# Patient Record
Sex: FEMALE | Race: WHITE | NOT HISPANIC OR LATINO
[De-identification: names, ages, dates, MRNs, and addresses within clinical notes are randomized per-mention and may not be internally consistent; named-entity substitution may affect disease eponyms.]

---

## 2017-06-13 ENCOUNTER — FORM ENCOUNTER (OUTPATIENT)
Age: 77
End: 2017-06-13

## 2017-06-14 ENCOUNTER — APPOINTMENT (OUTPATIENT)
Dept: RADIOLOGY | Facility: HOSPITAL | Age: 77
End: 2017-06-14

## 2017-06-14 ENCOUNTER — OUTPATIENT (OUTPATIENT)
Dept: OUTPATIENT SERVICES | Facility: HOSPITAL | Age: 77
LOS: 1 days | End: 2017-06-14
Payer: MEDICARE

## 2017-06-14 ENCOUNTER — APPOINTMENT (OUTPATIENT)
Dept: MRI IMAGING | Facility: HOSPITAL | Age: 77
End: 2017-06-14

## 2017-06-14 DIAGNOSIS — M43.16 SPONDYLOLISTHESIS, LUMBAR REGION: ICD-10-CM

## 2017-06-14 DIAGNOSIS — M81.0 AGE-RELATED OSTEOPOROSIS WITHOUT CURRENT PATHOLOGICAL FRACTURE: ICD-10-CM

## 2017-06-14 DIAGNOSIS — R07.81 PLEURODYNIA: ICD-10-CM

## 2017-06-14 DIAGNOSIS — M47.816 SPONDYLOSIS WITHOUT MYELOPATHY OR RADICULOPATHY, LUMBAR REGION: ICD-10-CM

## 2017-06-14 PROCEDURE — 71101 X-RAY EXAM UNILAT RIBS/CHEST: CPT

## 2017-06-14 PROCEDURE — 72148 MRI LUMBAR SPINE W/O DYE: CPT

## 2017-06-14 PROCEDURE — 77080 DXA BONE DENSITY AXIAL: CPT

## 2017-06-14 PROCEDURE — 72100 X-RAY EXAM L-S SPINE 2/3 VWS: CPT

## 2017-06-14 PROCEDURE — 72070 X-RAY EXAM THORAC SPINE 2VWS: CPT

## 2017-11-13 ENCOUNTER — MESSAGE (OUTPATIENT)
Age: 77
End: 2017-11-13

## 2018-03-19 ENCOUNTER — OUTPATIENT (OUTPATIENT)
Dept: OUTPATIENT SERVICES | Facility: HOSPITAL | Age: 78
LOS: 1 days | End: 2018-03-19
Payer: MEDICARE

## 2018-03-19 ENCOUNTER — APPOINTMENT (OUTPATIENT)
Dept: MAMMOGRAPHY | Facility: HOSPITAL | Age: 78
End: 2018-03-19

## 2018-03-19 DIAGNOSIS — Z12.31 ENCOUNTER FOR SCREENING MAMMOGRAM FOR MALIGNANT NEOPLASM OF BREAST: ICD-10-CM

## 2018-03-19 PROCEDURE — 77067 SCR MAMMO BI INCL CAD: CPT

## 2018-03-19 PROCEDURE — 77063 BREAST TOMOSYNTHESIS BI: CPT | Mod: 26

## 2018-03-19 PROCEDURE — 77063 BREAST TOMOSYNTHESIS BI: CPT

## 2018-03-19 PROCEDURE — 77067 SCR MAMMO BI INCL CAD: CPT | Mod: 26

## 2018-08-01 ENCOUNTER — OUTPATIENT (OUTPATIENT)
Dept: OUTPATIENT SERVICES | Facility: HOSPITAL | Age: 78
LOS: 1 days | End: 2018-08-01
Payer: MEDICARE

## 2018-08-01 ENCOUNTER — APPOINTMENT (OUTPATIENT)
Dept: ULTRASOUND IMAGING | Facility: HOSPITAL | Age: 78
End: 2018-08-01
Payer: MEDICARE

## 2018-08-01 DIAGNOSIS — Z00.8 ENCOUNTER FOR OTHER GENERAL EXAMINATION: ICD-10-CM

## 2018-08-01 PROCEDURE — 76770 US EXAM ABDO BACK WALL COMP: CPT

## 2018-08-01 PROCEDURE — 76770 US EXAM ABDO BACK WALL COMP: CPT | Mod: 26

## 2018-11-12 ENCOUNTER — APPOINTMENT (OUTPATIENT)
Dept: ULTRASOUND IMAGING | Facility: HOSPITAL | Age: 78
End: 2018-11-12

## 2018-11-26 ENCOUNTER — APPOINTMENT (OUTPATIENT)
Dept: ULTRASOUND IMAGING | Facility: HOSPITAL | Age: 78
End: 2018-11-26
Payer: MEDICARE

## 2018-11-26 ENCOUNTER — OUTPATIENT (OUTPATIENT)
Dept: OUTPATIENT SERVICES | Facility: HOSPITAL | Age: 78
LOS: 1 days | End: 2018-11-26
Payer: MEDICARE

## 2018-11-26 DIAGNOSIS — Z00.8 ENCOUNTER FOR OTHER GENERAL EXAMINATION: ICD-10-CM

## 2018-11-26 PROCEDURE — 76856 US EXAM PELVIC COMPLETE: CPT

## 2018-11-26 PROCEDURE — 76856 US EXAM PELVIC COMPLETE: CPT | Mod: 26

## 2019-01-04 ENCOUNTER — APPOINTMENT (OUTPATIENT)
Dept: CARDIOLOGY | Facility: CLINIC | Age: 79
End: 2019-01-04

## 2019-02-19 ENCOUNTER — APPOINTMENT (OUTPATIENT)
Dept: OBGYN | Facility: CLINIC | Age: 79
End: 2019-02-19
Payer: MEDICARE

## 2019-02-19 PROCEDURE — 99203 OFFICE O/P NEW LOW 30 MIN: CPT

## 2019-03-05 ENCOUNTER — APPOINTMENT (OUTPATIENT)
Dept: FAMILY MEDICINE | Facility: CLINIC | Age: 79
End: 2019-03-05
Payer: MEDICARE

## 2019-03-05 VITALS
HEIGHT: 61 IN | OXYGEN SATURATION: 98 % | SYSTOLIC BLOOD PRESSURE: 116 MMHG | HEART RATE: 84 BPM | DIASTOLIC BLOOD PRESSURE: 60 MMHG | BODY MASS INDEX: 22.84 KG/M2 | TEMPERATURE: 97 F | WEIGHT: 121 LBS | RESPIRATION RATE: 15 BRPM

## 2019-03-05 DIAGNOSIS — Z80.3 FAMILY HISTORY OF MALIGNANT NEOPLASM OF BREAST: ICD-10-CM

## 2019-03-05 DIAGNOSIS — Z81.8 FAMILY HISTORY OF OTHER MENTAL AND BEHAVIORAL DISORDERS: ICD-10-CM

## 2019-03-05 DIAGNOSIS — Z82.49 FAMILY HISTORY OF ISCHEMIC HEART DISEASE AND OTHER DISEASES OF THE CIRCULATORY SYSTEM: ICD-10-CM

## 2019-03-05 PROCEDURE — 99203 OFFICE O/P NEW LOW 30 MIN: CPT

## 2019-03-06 PROBLEM — Z81.8 FAMILY HISTORY OF BIPOLAR DISORDER: Status: ACTIVE | Noted: 2019-03-06

## 2019-03-06 NOTE — ASSESSMENT
[FreeTextEntry1] : +Slow-healing wound. \par keep covered with Vaseline and dry clean dressing/bandage daily and prn\par protect from sun-use sunscreen on area once healed\par RTO 4 weeks if wound has not completely healed by that time or if grows in size rather than getting smaller

## 2019-03-06 NOTE — PHYSICAL EXAM
[No Acute Distress] : no acute distress [Well Nourished] : well nourished [Well Developed] : well developed [Well-Appearing] : well-appearing [Normal Voice/Communication] : normal voice/communication [Normal Sclera/Conjunctiva] : normal sclera/conjunctiva [Normal Outer Ear/Nose] : the outer ears and nose were normal in appearance [Normal Oropharynx] : the oropharynx was normal [No Respiratory Distress] : no respiratory distress  [Clear to Auscultation] : lungs were clear to auscultation bilaterally [No Accessory Muscle Use] : no accessory muscle use [Normal Rate] : normal rate  [Regular Rhythm] : with a regular rhythm [Normal S1, S2] : normal S1 and S2 [Supple] : supple [No Lymphadenopathy] : no lymphadenopathy [Normal Supraclavicular Nodes] : no supraclavicular lymphadenopathy [Normal Anterior Cervical Nodes] : no anterior cervical lymphadenopathy [Normal Gait] : normal gait [Speech Grossly Normal] : speech grossly normal [Memory Grossly Normal] : memory grossly normal [Normal Affect] : the affect was normal [Alert and Oriented x3] : oriented to person, place, and time [Normal Mood] : the mood was normal [Normal Insight/Judgement] : insight and judgment were intact [de-identified] : right cheek: 0.6 x 0.4 x scab at biopsy site. No drainage, dry. Patient has been using Vaseline on and off on it.

## 2019-03-06 NOTE — REVIEW OF SYSTEMS
[Diarrhea] : diarrhea [Negative] : Heme/Lymph [FreeTextEntry2] : seasonal allergies [FreeTextEntry7] : +irritable bowel syndrome

## 2019-03-06 NOTE — HISTORY OF PRESENT ILLNESS
[FreeTextEntry8] : Patient presents for new patient acute visit c/o right cheek biopsy on 12/16/18 at dermatologist's office, was a deep biopsy. Started measuring wound about 5 weeks ago; length and width did not change x 5 weeks, but states depth has improved in that time. Since biopsy, both she and her  had influenza A (had vaccine), and took 2 weeks to recover. Otherwise feels well. Eating healthy diet. Does not want to return to to same dermatologist for f/u. Has not returned for f/u visit at all. Sees gyn on a regular basis; d/w patient important to be up to date on mammograms and PAPs when using estrace vaginal cream. \par \par last mammogram - 4/2018\par

## 2019-04-01 ENCOUNTER — OUTPATIENT (OUTPATIENT)
Dept: OUTPATIENT SERVICES | Facility: HOSPITAL | Age: 79
LOS: 1 days | End: 2019-04-01
Payer: MEDICARE

## 2019-04-01 ENCOUNTER — APPOINTMENT (OUTPATIENT)
Dept: MAMMOGRAPHY | Facility: HOSPITAL | Age: 79
End: 2019-04-01
Payer: MEDICARE

## 2019-04-01 ENCOUNTER — APPOINTMENT (OUTPATIENT)
Dept: ULTRASOUND IMAGING | Facility: HOSPITAL | Age: 79
End: 2019-04-01
Payer: MEDICARE

## 2019-04-01 DIAGNOSIS — Z00.8 ENCOUNTER FOR OTHER GENERAL EXAMINATION: ICD-10-CM

## 2019-04-01 PROCEDURE — 77067 SCR MAMMO BI INCL CAD: CPT | Mod: 26

## 2019-04-01 PROCEDURE — 77063 BREAST TOMOSYNTHESIS BI: CPT

## 2019-04-01 PROCEDURE — 76641 ULTRASOUND BREAST COMPLETE: CPT | Mod: 26,50

## 2019-04-01 PROCEDURE — 77063 BREAST TOMOSYNTHESIS BI: CPT | Mod: 26

## 2019-04-01 PROCEDURE — 76641 ULTRASOUND BREAST COMPLETE: CPT

## 2019-04-01 PROCEDURE — 77067 SCR MAMMO BI INCL CAD: CPT

## 2019-04-02 ENCOUNTER — APPOINTMENT (OUTPATIENT)
Dept: OBGYN | Facility: CLINIC | Age: 79
End: 2019-04-02
Payer: MEDICARE

## 2019-04-02 PROCEDURE — 99214 OFFICE O/P EST MOD 30 MIN: CPT

## 2019-04-09 ENCOUNTER — APPOINTMENT (OUTPATIENT)
Dept: FAMILY MEDICINE | Facility: CLINIC | Age: 79
End: 2019-04-09
Payer: MEDICARE

## 2019-04-09 VITALS
DIASTOLIC BLOOD PRESSURE: 70 MMHG | TEMPERATURE: 98 F | SYSTOLIC BLOOD PRESSURE: 110 MMHG | HEIGHT: 61 IN | BODY MASS INDEX: 21.9 KG/M2 | RESPIRATION RATE: 16 BRPM | HEART RATE: 84 BPM | OXYGEN SATURATION: 98 % | WEIGHT: 116 LBS

## 2019-04-09 DIAGNOSIS — L98.9 DISORDER OF THE SKIN AND SUBCUTANEOUS TISSUE, UNSPECIFIED: ICD-10-CM

## 2019-04-09 PROCEDURE — 99213 OFFICE O/P EST LOW 20 MIN: CPT

## 2019-04-09 NOTE — HISTORY OF PRESENT ILLNESS
[FreeTextEntry1] : Patient presents for f/u right cheek wound s/p biopsy, states she has been covering wound at night and using vaseline every day. Does not think it is healed.

## 2019-04-09 NOTE — PHYSICAL EXAM
[No Acute Distress] : no acute distress [Well Nourished] : well nourished [Well Developed] : well developed [Normal Voice/Communication] : normal voice/communication [Well-Appearing] : well-appearing [Normal Sclera/Conjunctiva] : normal sclera/conjunctiva [Normal Outer Ear/Nose] : the outer ears and nose were normal in appearance [Normal Affect] : the affect was normal [Normal Insight/Judgement] : insight and judgment were intact [de-identified] : right cheek: wound closed, +post inflammatory hyperpigmentation, +epithelialized

## 2019-04-09 NOTE — ASSESSMENT
[FreeTextEntry1] : wound resolved, +hyperpigmented area remains, use sunscreen 50 on the hyperpigmented area when outside at all\par patient states received pathology report in mail from dermatologist who did biopsy, will send to me via fax asap\par await pathology report-received pathology report: +actinic keratosis early lesion\par ok to continue with plan: keep covered or with 50 sunscreen on hyperpigmented area \par RTO if any change in area in skin area

## 2019-04-23 ENCOUNTER — TRANSCRIPTION ENCOUNTER (OUTPATIENT)
Age: 79
End: 2019-04-23

## 2019-05-16 ENCOUNTER — OUTPATIENT (OUTPATIENT)
Dept: OUTPATIENT SERVICES | Facility: HOSPITAL | Age: 79
LOS: 1 days | End: 2019-05-16
Payer: MEDICARE

## 2019-05-16 ENCOUNTER — APPOINTMENT (OUTPATIENT)
Dept: ULTRASOUND IMAGING | Facility: HOSPITAL | Age: 79
End: 2019-05-16
Payer: MEDICARE

## 2019-05-16 DIAGNOSIS — Z00.8 ENCOUNTER FOR OTHER GENERAL EXAMINATION: ICD-10-CM

## 2019-05-16 PROCEDURE — 76856 US EXAM PELVIC COMPLETE: CPT

## 2019-05-16 PROCEDURE — 76856 US EXAM PELVIC COMPLETE: CPT | Mod: 26

## 2019-05-21 ENCOUNTER — APPOINTMENT (OUTPATIENT)
Dept: OBGYN | Facility: CLINIC | Age: 79
End: 2019-05-21
Payer: MEDICARE

## 2019-05-21 PROCEDURE — 99213 OFFICE O/P EST LOW 20 MIN: CPT

## 2020-11-04 ENCOUNTER — TRANSCRIPTION ENCOUNTER (OUTPATIENT)
Age: 80
End: 2020-11-04

## 2020-11-09 ENCOUNTER — OUTPATIENT (OUTPATIENT)
Dept: OUTPATIENT SERVICES | Facility: HOSPITAL | Age: 80
LOS: 1 days | End: 2020-11-09
Payer: MEDICARE

## 2020-11-09 ENCOUNTER — APPOINTMENT (OUTPATIENT)
Dept: ULTRASOUND IMAGING | Facility: HOSPITAL | Age: 80
End: 2020-11-09
Payer: MEDICARE

## 2020-11-09 DIAGNOSIS — M25.552 PAIN IN LEFT HIP: ICD-10-CM

## 2020-11-09 PROCEDURE — 76856 US EXAM PELVIC COMPLETE: CPT | Mod: 26

## 2020-11-09 PROCEDURE — 76856 US EXAM PELVIC COMPLETE: CPT

## 2020-11-17 ENCOUNTER — APPOINTMENT (OUTPATIENT)
Dept: ULTRASOUND IMAGING | Facility: HOSPITAL | Age: 80
End: 2020-11-17
Payer: MEDICARE

## 2020-11-17 ENCOUNTER — APPOINTMENT (OUTPATIENT)
Dept: MAMMOGRAPHY | Facility: HOSPITAL | Age: 80
End: 2020-11-17
Payer: MEDICARE

## 2020-11-17 ENCOUNTER — OUTPATIENT (OUTPATIENT)
Dept: OUTPATIENT SERVICES | Facility: HOSPITAL | Age: 80
LOS: 1 days | End: 2020-11-17
Payer: MEDICARE

## 2020-11-17 DIAGNOSIS — Z00.8 ENCOUNTER FOR OTHER GENERAL EXAMINATION: ICD-10-CM

## 2020-11-17 PROCEDURE — 76641 ULTRASOUND BREAST COMPLETE: CPT | Mod: 26,50

## 2020-11-17 PROCEDURE — 77067 SCR MAMMO BI INCL CAD: CPT

## 2020-11-17 PROCEDURE — 77063 BREAST TOMOSYNTHESIS BI: CPT

## 2020-11-17 PROCEDURE — 76641 ULTRASOUND BREAST COMPLETE: CPT

## 2020-11-17 PROCEDURE — 77063 BREAST TOMOSYNTHESIS BI: CPT | Mod: 26

## 2020-11-17 PROCEDURE — 77067 SCR MAMMO BI INCL CAD: CPT | Mod: 26

## 2021-07-09 ENCOUNTER — INPATIENT (INPATIENT)
Facility: HOSPITAL | Age: 81
LOS: 3 days | Discharge: HOME | DRG: 372 | End: 2021-07-13
Attending: STUDENT IN AN ORGANIZED HEALTH CARE EDUCATION/TRAINING PROGRAM | Admitting: INTERNAL MEDICINE
Payer: MEDICARE

## 2021-07-09 VITALS
SYSTOLIC BLOOD PRESSURE: 123 MMHG | HEART RATE: 94 BPM | DIASTOLIC BLOOD PRESSURE: 65 MMHG | RESPIRATION RATE: 16 BRPM | OXYGEN SATURATION: 97 % | TEMPERATURE: 100 F | HEIGHT: 65 IN | WEIGHT: 113.98 LBS

## 2021-07-09 DIAGNOSIS — Z98.890 OTHER SPECIFIED POSTPROCEDURAL STATES: Chronic | ICD-10-CM

## 2021-07-09 DIAGNOSIS — Z98.891 HISTORY OF UTERINE SCAR FROM PREVIOUS SURGERY: Chronic | ICD-10-CM

## 2021-07-09 DIAGNOSIS — N39.0 URINARY TRACT INFECTION, SITE NOT SPECIFIED: ICD-10-CM

## 2021-07-09 LAB
ALBUMIN SERPL ELPH-MCNC: 3.8 G/DL — SIGNIFICANT CHANGE UP (ref 3.3–5)
ALP SERPL-CCNC: 71 U/L — SIGNIFICANT CHANGE UP (ref 40–120)
ALT FLD-CCNC: 38 U/L — SIGNIFICANT CHANGE UP (ref 10–45)
ANION GAP SERPL CALC-SCNC: 9 MMOL/L — SIGNIFICANT CHANGE UP (ref 5–17)
APPEARANCE UR: ABNORMAL
APTT BLD: 27.5 SEC — SIGNIFICANT CHANGE UP (ref 27.5–35.5)
AST SERPL-CCNC: 24 U/L — SIGNIFICANT CHANGE UP (ref 10–40)
BACTERIA # UR AUTO: ABNORMAL /HPF
BASOPHILS # BLD AUTO: 0.03 K/UL — SIGNIFICANT CHANGE UP (ref 0–0.2)
BASOPHILS NFR BLD AUTO: 0.3 % — SIGNIFICANT CHANGE UP (ref 0–2)
BILIRUB SERPL-MCNC: 0.4 MG/DL — SIGNIFICANT CHANGE UP (ref 0.2–1.2)
BILIRUB UR-MCNC: NEGATIVE — SIGNIFICANT CHANGE UP
BUN SERPL-MCNC: 20 MG/DL — SIGNIFICANT CHANGE UP (ref 7–23)
CALCIUM SERPL-MCNC: 8.7 MG/DL — SIGNIFICANT CHANGE UP (ref 8.4–10.5)
CHLORIDE SERPL-SCNC: 100 MMOL/L — SIGNIFICANT CHANGE UP (ref 96–108)
CO2 SERPL-SCNC: 25 MMOL/L — SIGNIFICANT CHANGE UP (ref 22–31)
COLOR SPEC: YELLOW — SIGNIFICANT CHANGE UP
COMMENT - URINE: SIGNIFICANT CHANGE UP
CREAT SERPL-MCNC: 0.74 MG/DL — SIGNIFICANT CHANGE UP (ref 0.5–1.3)
DIFF PNL FLD: ABNORMAL
EOSINOPHIL # BLD AUTO: 0.01 K/UL — SIGNIFICANT CHANGE UP (ref 0–0.5)
EOSINOPHIL NFR BLD AUTO: 0.1 % — SIGNIFICANT CHANGE UP (ref 0–6)
EPI CELLS # UR: ABNORMAL
GLUCOSE SERPL-MCNC: 123 MG/DL — HIGH (ref 70–99)
GLUCOSE UR QL: NEGATIVE — SIGNIFICANT CHANGE UP
HCT VFR BLD CALC: 35.4 % — SIGNIFICANT CHANGE UP (ref 34.5–45)
HGB BLD-MCNC: 11.9 G/DL — SIGNIFICANT CHANGE UP (ref 11.5–15.5)
IMM GRANULOCYTES NFR BLD AUTO: 0.3 % — SIGNIFICANT CHANGE UP (ref 0–1.5)
INR BLD: 1.01 RATIO — SIGNIFICANT CHANGE UP (ref 0.88–1.16)
KETONES UR-MCNC: NEGATIVE — SIGNIFICANT CHANGE UP
LACTATE SERPL-SCNC: 0.9 MMOL/L — SIGNIFICANT CHANGE UP (ref 0.7–2)
LEUKOCYTE ESTERASE UR-ACNC: NEGATIVE — SIGNIFICANT CHANGE UP
LYMPHOCYTES # BLD AUTO: 0.43 K/UL — LOW (ref 1–3.3)
LYMPHOCYTES # BLD AUTO: 3.9 % — LOW (ref 13–44)
MCHC RBC-ENTMCNC: 33.4 PG — SIGNIFICANT CHANGE UP (ref 27–34)
MCHC RBC-ENTMCNC: 33.6 GM/DL — SIGNIFICANT CHANGE UP (ref 32–36)
MCV RBC AUTO: 99.4 FL — SIGNIFICANT CHANGE UP (ref 80–100)
MONOCYTES # BLD AUTO: 0.56 K/UL — SIGNIFICANT CHANGE UP (ref 0–0.9)
MONOCYTES NFR BLD AUTO: 5.1 % — SIGNIFICANT CHANGE UP (ref 2–14)
NEUTROPHILS # BLD AUTO: 9.93 K/UL — HIGH (ref 1.8–7.4)
NEUTROPHILS NFR BLD AUTO: 90.3 % — HIGH (ref 43–77)
NITRITE UR-MCNC: NEGATIVE — SIGNIFICANT CHANGE UP
NRBC # BLD: 0 /100 WBCS — SIGNIFICANT CHANGE UP (ref 0–0)
PH UR: 6 — SIGNIFICANT CHANGE UP (ref 5–8)
PLATELET # BLD AUTO: 175 K/UL — SIGNIFICANT CHANGE UP (ref 150–400)
POTASSIUM SERPL-MCNC: 3.4 MMOL/L — LOW (ref 3.5–5.3)
POTASSIUM SERPL-SCNC: 3.4 MMOL/L — LOW (ref 3.5–5.3)
PROT SERPL-MCNC: 7.4 G/DL — SIGNIFICANT CHANGE UP (ref 6–8.3)
PROT UR-MCNC: NEGATIVE — SIGNIFICANT CHANGE UP
PROTHROM AB SERPL-ACNC: 12.2 SEC — SIGNIFICANT CHANGE UP (ref 10.6–13.6)
RBC # BLD: 3.56 M/UL — LOW (ref 3.8–5.2)
RBC # FLD: 13.2 % — SIGNIFICANT CHANGE UP (ref 10.3–14.5)
RBC CASTS # UR COMP ASSIST: SIGNIFICANT CHANGE UP /HPF (ref 0–4)
SODIUM SERPL-SCNC: 134 MMOL/L — LOW (ref 135–145)
SP GR SPEC: 1.02 — SIGNIFICANT CHANGE UP (ref 1.01–1.02)
UROBILINOGEN FLD QL: NEGATIVE — SIGNIFICANT CHANGE UP
WBC # BLD: 10.99 K/UL — HIGH (ref 3.8–10.5)
WBC # FLD AUTO: 10.99 K/UL — HIGH (ref 3.8–10.5)
WBC UR QL: SIGNIFICANT CHANGE UP /HPF (ref 0–5)

## 2021-07-09 PROCEDURE — 99285 EMERGENCY DEPT VISIT HI MDM: CPT

## 2021-07-09 PROCEDURE — 71045 X-RAY EXAM CHEST 1 VIEW: CPT | Mod: 26

## 2021-07-09 PROCEDURE — 93010 ELECTROCARDIOGRAM REPORT: CPT

## 2021-07-09 PROCEDURE — 99222 1ST HOSP IP/OBS MODERATE 55: CPT

## 2021-07-09 RX ORDER — SODIUM CHLORIDE 9 MG/ML
1750 INJECTION INTRAMUSCULAR; INTRAVENOUS; SUBCUTANEOUS ONCE
Refills: 0 | Status: COMPLETED | OUTPATIENT
Start: 2021-07-09 | End: 2021-07-09

## 2021-07-09 RX ORDER — ALPRAZOLAM 0.25 MG
2 TABLET ORAL
Qty: 0 | Refills: 0 | DISCHARGE

## 2021-07-09 RX ORDER — ACETAMINOPHEN 500 MG
650 TABLET ORAL EVERY 6 HOURS
Refills: 0 | Status: DISCONTINUED | OUTPATIENT
Start: 2021-07-09 | End: 2021-07-13

## 2021-07-09 RX ORDER — LACTOBACILLUS ACIDOPHILUS 100MM CELL
1 CAPSULE ORAL DAILY
Refills: 0 | Status: DISCONTINUED | OUTPATIENT
Start: 2021-07-09 | End: 2021-07-10

## 2021-07-09 RX ORDER — PANTOPRAZOLE SODIUM 20 MG/1
40 TABLET, DELAYED RELEASE ORAL
Refills: 0 | Status: DISCONTINUED | OUTPATIENT
Start: 2021-07-09 | End: 2021-07-12

## 2021-07-09 RX ORDER — ALPRAZOLAM 0.25 MG
0.5 TABLET ORAL AT BEDTIME
Refills: 0 | Status: DISCONTINUED | OUTPATIENT
Start: 2021-07-09 | End: 2021-07-13

## 2021-07-09 RX ORDER — ENOXAPARIN SODIUM 100 MG/ML
40 INJECTION SUBCUTANEOUS DAILY
Refills: 0 | Status: DISCONTINUED | OUTPATIENT
Start: 2021-07-09 | End: 2021-07-13

## 2021-07-09 RX ORDER — ACETAMINOPHEN 500 MG
650 TABLET ORAL ONCE
Refills: 0 | Status: DISCONTINUED | OUTPATIENT
Start: 2021-07-09 | End: 2021-07-09

## 2021-07-09 RX ORDER — ACETAMINOPHEN 500 MG
650 TABLET ORAL ONCE
Refills: 0 | Status: COMPLETED | OUTPATIENT
Start: 2021-07-09 | End: 2021-07-09

## 2021-07-09 RX ORDER — POTASSIUM CHLORIDE 20 MEQ
40 PACKET (EA) ORAL ONCE
Refills: 0 | Status: COMPLETED | OUTPATIENT
Start: 2021-07-09 | End: 2021-07-09

## 2021-07-09 RX ADMIN — Medication 650 MILLIGRAM(S): at 21:06

## 2021-07-09 RX ADMIN — SODIUM CHLORIDE 1750 MILLILITER(S): 9 INJECTION INTRAMUSCULAR; INTRAVENOUS; SUBCUTANEOUS at 22:20

## 2021-07-09 RX ADMIN — Medication 650 MILLIGRAM(S): at 22:06

## 2021-07-09 RX ADMIN — Medication 40 MILLIEQUIVALENT(S): at 22:20

## 2021-07-09 RX ADMIN — SODIUM CHLORIDE 1750 MILLILITER(S): 9 INJECTION INTRAMUSCULAR; INTRAVENOUS; SUBCUTANEOUS at 20:45

## 2021-07-09 NOTE — H&P ADULT - ASSESSMENT
81F no sigPMHx other than IBS pw UTI.     # UTI with fevers despite antibx use.   Cont with IV Levaquin for now instead of cephalosporin.   Contact Dr Milan [phone] in AM for E coli sensitivities.   No other obvious infection source.   Check RVP to be complete.   GI PCR panel to be complete.   ID consult.     #Mild hypokalemia  repleted. check mag.     #DVT/GI proph

## 2021-07-09 NOTE — ED ADULT NURSE NOTE - NSIMPLEMENTINTERV_GEN_ALL_ED
Implemented All Universal Safety Interventions:  [place] to call system. Call bell, personal items and telephone within reach. Instruct patient to call for assistance. Room bathroom lighting operational. Non-slip footwear when patient is off stretcher. Physically safe environment: no spills, clutter or unnecessary equipment. Stretcher in lowest position, wheels locked, appropriate side rails in place.

## 2021-07-09 NOTE — ED PROVIDER NOTE - OBJECTIVE STATEMENT
80yo F with PMHx of IBS, urethra growth presented with urinary frequency and urgency for about 4-5 days. pt states she had a routine visit with her urologist at [facility] this week for follow up on his growth and he sent urine cultures and treated her with Vantin which she states she has been taking, has diarrhea from it and she is still having urinary frequency and urgency and spiking fevers, today Tmax was 101. pt states she did not take tylenol or motrin today for her fever.

## 2021-07-09 NOTE — ED PROVIDER NOTE - CLINICAL SUMMARY MEDICAL DECISION MAKING FREE TEXT BOX
80yo F with PMHx of IBS, urethra growth presented with urinary frequency and urgency for about 4-5 days. pt took Vantin with no relief. pt has increase WBC, temp will treat with IV antibiotics and admit for further workup. pt agrees with plan.

## 2021-07-09 NOTE — H&P ADULT - HISTORY OF PRESENT ILLNESS
81F no sigPMHx other than IBS pw UTI. Pt related known urethral caruncle that was noted to be source of hematuria after urologic workup several years ago. Pt noted some urinary urgency but no dysuria or hematuria for the past several weeks. Incidentally saw Dr Ramón Milan urologist last week as new pt for second opinion and had routine UA/culture which noted >100,000 Ecoli (sensitivities unknown) and was prescribed Vantin 4 days ago. Noted increased fatigue for past several days, today noted fevers max 102 and had 3 loose stools which is typical of her IBS which is commonly exac with antibxs. No improvement in urinary urgency but no new sxs of dysuria, hematuria or flank pain. No HA, cough, sore throat, NV, abd pain. No sick contacts. Had Moderna COVID vaccine 2nd shot in 2/2021. No further loose stools since in ED.  Hx of remote C diff over 15 years ago but not in setting of antibx use. No hx of recurrent UTIs.

## 2021-07-09 NOTE — H&P ADULT - NSHPLABSRESULTS_GEN_ALL_CORE
11.9   10.99 )-----------( 175      ( 2021 20:30 )             35.4           134<L>  |  100  |  20  ----------------------------<  123<H>  3.4<L>   |  25  |  0.74    Ca    8.7      2021 20:30    TPro  7.4  /  Alb  3.8  /  TBili  0.4  /  DBili  x   /  AST  24  /  ALT  38  /  AlkPhos  71      Lactate, Blood: 0.9 mmol/L ( @ 20:30)       LIVER FUNCTIONS - ( 2021 20:30 )  Alb: 3.8 g/dL / Pro: 7.4 g/dL / ALK PHOS: 71 U/L / ALT: 38 U/L / AST: 24 U/L / GGT: x               PT/INR - ( 2021 20:30 )   PT: 12.2 sec;   INR: 1.01 ratio         PTT - ( 2021 20:30 )  PTT:27.5 sec          Urinalysis Basic - ( 2021 20:30 )    Color: Yellow / Appearance: Slightly Turbid / S.020 / pH: x  Gluc: x / Ketone: Negative  / Bili: Negative / Urobili: Negative   Blood: x / Protein: Negative / Nitrite: Negative   Leuk Esterase: Negative / RBC: 0-4 /HPF / WBC 0-2 /HPF   Sq Epi: x / Non Sq Epi: Many / Bacteria: Few /HPF        CAPILLARY BLOOD GLUCOSE            EKG: personally reviewed. NSR at 89bpm, no acute ST changes      CXR: wet read. personally reviewed. No overt infiltrate.

## 2021-07-09 NOTE — H&P ADULT - NSHPPHYSICALEXAM_GEN_ALL_CORE
Vital Signs Last 24 Hrs  T(C): 38.3 (09 Jul 2021 20:00), Max: 38.3 (09 Jul 2021 20:00)  T(F): 100.9 (09 Jul 2021 20:00), Max: 100.9 (09 Jul 2021 20:00)  HR: 82 (09 Jul 2021 21:30) (79 - 94)  BP: 118/55 (09 Jul 2021 21:30) (94/55 - 127/67)  BP(mean): 74 (09 Jul 2021 21:30) (69 - 109)  RR: 18 (09 Jul 2021 21:30) (16 - 18)  SpO2: 100% (09 Jul 2021 21:30) (96% - 100%)  Daily Height in cm: 165.1 (09 Jul 2021 19:39)    Daily   CAPILLARY BLOOD GLUCOSE        I&O's Summary      GENERAL: NAD  HEAD:  Normocephalic  EYES: EOMI, PERRLA, conjunctiva and sclera clear  ENMT: No tonsillar erythema, exudates, or enlargement; Moist mucous membranes, No lesions  NECK: Supple, No JVD, no bruit, normal thyroid  NERVOUS SYSTEM:  Alert & Oriented X3, Good concentration; grossly  Motor Strength 5/5 B/L upper and lower extremities; DTRs 2+ intact and symmetric  CHEST/LUNG: Clear to auscultation bilaterally; No rales, rhonchi, wheezing, or rubs  HEART: Regular rate and rhythm; No murmurs, rubs, or gallops  ABDOMEN: Soft, Nontender, Nondistended; Bowel sounds present. NO CVA tenderness.   EXTREMITIES:  2+ Peripheral Pulses, No clubbing, cyanosis, or edema  LYMPH: No lymphadenopathy noted  SKIN: No rashes or lesions

## 2021-07-09 NOTE — ED PROVIDER NOTE - ATTENDING CONTRIBUTION TO CARE
pt c dysuria, urgency about 1 week, dx with UTI by urologist with ecoli >100k, no susceptibility reported yet. has been on vantin for 4 days with diarrhea starting after vantin, last few days worsening with fatigue and fevers now 102 max.    exam:   General: well appearing, NAD.   HEENT: eyes perrl, nose normal, OP no erythema/exudate/swelling.   cor: RRR, s1s2, 2+rad pulses.   lungs: ctabl, no resp distress.   abd: soft, ntnd. no cvat  neuro: a&ox3, cn2-12 intact, NEW, 5/5 strength c nl sensation all extremities, nl coordination.   MSK: no extremity swelling.  Skin: normal, no rash    AP: fevers, malaise with Ecoli UTI, worsening s/p 4 days vantin.  concern for outpt treatment failure. ?abx resistance. r/o sepsis. check labs. start iv abx. admit

## 2021-07-09 NOTE — ED ADULT TRIAGE NOTE - CHIEF COMPLAINT QUOTE
Patient being treated for UTI x 4 days with oral antibiotics, now c/o intermittent fevers. Patient being treated for UTI x 4 days with oral antibiotics, now c/o intermittent fevers. ISAR negative

## 2021-07-09 NOTE — ED PROVIDER NOTE - RELIEVING FACTORS
none
CT of abdomen shows no appendicitis

## 2021-07-09 NOTE — CHART NOTE - NSCHARTNOTEFT_GEN_A_CORE
This report was requested by: Meghann English | Reference #: [id number]           You have not added a MAGI number. Keeping your MAGI number(s) up to date on the My MAGI # page will enable the separation of your prescriptions from others in the search results.          Others' Prescriptions        Patient Name: Marianela Silva     YOB: 1940       Address: [address] [place] [address]     Sex: Female                     Rx Written    Rx Dispensed    Drug    Quantity    Days Supply    Prescriber Name    Prescriber Magi #    Payment Method          06/14/2021 06/16/2021 alprazolam 0.25 mg tablet  60 30 Noemi Vogt MD [id number] Medicare [facility] #[id number]     05/12/2021 05/12/2021 alprazolam 0.25 mg tablet  60 30 Noemi Vogt MD [id number] Medicare [facility] #[id number]     04/12/2021 04/13/2021 alprazolam 0.25 mg tablet  60 30 FinNoemi correa MD [id number] Medicare [facility] #[id number]     03/10/2021 03/11/2021 alprazolam 0.25 mg tablet  60 30 FinNoemi correa MD [id number] Medicare    [facility] #[id number]     02/08/2021 02/09/2021 alprazolam 0.25 mg tablet  60 30 FinNoemi correa MD [id number] Medicare [facility] #[id number]     01/07/2021 01/08/2021 alprazolam 0.25 mg tablet  60 30 Noemi Vogt MD [id number] Medicare    [facility] #[id number]     12/04/2020 12/05/2020 alprazolam 0.25 mg tablet  60 30 Noemi Vogt MD [id number] Medicare    [facility] #[id number]     11/02/2020 11/03/2020 alprazolam 0.25 mg tablet  60 30 Noemi Vogt MD [id number] Medicare [facility] #[id number]     10/01/2020 10/02/2020 alprazolam 0.25 mg tablet  60 30 Noemi Vogt MD [id number] Medicare [facility] #[id number]     09/01/2020 09/03/2020 alprazolam 0.25 mg tablet  60 30 Noemi Vogt MD [id number] Medicare    [facility] #[id number]     07/29/2020 07/29/2020 alprazolam 0.25 mg tablet  60 30 Noemi Vogt MD [id number] Medicare [facility] #[id number]                 * - Drugs marked with an asterisk are compound drugs. If the compound drug is made up of more than one controlled substance, then each controlled substance will be a separate row in the table.

## 2021-07-09 NOTE — H&P ADULT - NSHPREVIEWOFSYSTEMS_GEN_ALL_CORE
CONSTITUTIONAL: + fevers, weight loss, ++ fatigue  EYES: No eye pain, visual disturbances, or discharge  ENMT:  No difficulty hearing, tinnitus, vertigo; No sinus or throat pain  NECK: No pain or stiffness  RESPIRATORY: No cough, wheezing, chills or hemoptysis; No shortness of breath  CARDIOVASCULAR: No chest pain, palpitations, dizziness, or leg swelling  GASTROINTESTINAL: No abdominal or epigastric pain. No nausea, vomiting, or hematemesis; No diarrhea or constipation. No melena or hematochezia.  GENITOURINARY: No dysuria, +frequency/urgency, no hematuria, or incontinence  NEUROLOGICAL: No headaches, memory loss, loss of strength, numbness, or tremors  SKIN: No itching, burning, rashes, or lesions   LYMPH NODES: No enlarged glands  ENDOCRINE: No heat or cold intolerance; No hair loss  MUSCULOSKELETAL: No joint pain or swelling; No muscle, back, or extremity pain  PSYCHIATRIC: No depression, anxiety, mood swings, or difficulty sleeping  HEME/LYMPH: No easy bruising, or bleeding gums  ALLERY AND IMMUNOLOGIC: No hives or eczema

## 2021-07-09 NOTE — H&P ADULT - NSHPSOCIALHISTORY_GEN_ALL_CORE
FMHx: mother with breast cancer, and MI  father with hx of rheumatic dz and CAD    Sochx: remote brief tob use as teenager, no ETOH or illicit drug use.

## 2021-07-09 NOTE — ED ADULT NURSE NOTE - OBJECTIVE STATEMENT
Patient being treated for UTI x 4 days with oral antibiotics, now c/o intermittent fevers. ISAR negative

## 2021-07-10 LAB
ALBUMIN SERPL ELPH-MCNC: 3.1 G/DL — LOW (ref 3.3–5)
ALP SERPL-CCNC: 56 U/L — SIGNIFICANT CHANGE UP (ref 40–120)
ALT FLD-CCNC: 22 U/L — SIGNIFICANT CHANGE UP (ref 10–45)
ANION GAP SERPL CALC-SCNC: 11 MMOL/L — SIGNIFICANT CHANGE UP (ref 5–17)
AST SERPL-CCNC: 11 U/L — SIGNIFICANT CHANGE UP (ref 10–40)
B PERT DNA SPEC QL NAA+PROBE: SIGNIFICANT CHANGE UP
BASOPHILS # BLD AUTO: 0.04 K/UL — SIGNIFICANT CHANGE UP (ref 0–0.2)
BASOPHILS NFR BLD AUTO: 0.3 % — SIGNIFICANT CHANGE UP (ref 0–2)
BILIRUB SERPL-MCNC: 0.6 MG/DL — SIGNIFICANT CHANGE UP (ref 0.2–1.2)
BUN SERPL-MCNC: 11 MG/DL — SIGNIFICANT CHANGE UP (ref 7–23)
C PNEUM DNA SPEC QL NAA+PROBE: SIGNIFICANT CHANGE UP
CALCIUM SERPL-MCNC: 7.9 MG/DL — LOW (ref 8.4–10.5)
CHLORIDE SERPL-SCNC: 104 MMOL/L — SIGNIFICANT CHANGE UP (ref 96–108)
CO2 SERPL-SCNC: 20 MMOL/L — LOW (ref 22–31)
COVID-19 SPIKE DOMAIN AB INTERP: POSITIVE
COVID-19 SPIKE DOMAIN ANTIBODY RESULT: >250 U/ML — HIGH
CREAT SERPL-MCNC: 0.77 MG/DL — SIGNIFICANT CHANGE UP (ref 0.5–1.3)
CULTURE RESULTS: SIGNIFICANT CHANGE UP
EOSINOPHIL # BLD AUTO: 0 K/UL — SIGNIFICANT CHANGE UP (ref 0–0.5)
EOSINOPHIL NFR BLD AUTO: 0 % — SIGNIFICANT CHANGE UP (ref 0–6)
FLUAV H1 2009 PAND RNA SPEC QL NAA+PROBE: SIGNIFICANT CHANGE UP
FLUAV H1 RNA SPEC QL NAA+PROBE: SIGNIFICANT CHANGE UP
FLUAV H3 RNA SPEC QL NAA+PROBE: SIGNIFICANT CHANGE UP
FLUAV SUBTYP SPEC NAA+PROBE: SIGNIFICANT CHANGE UP
FLUBV RNA SPEC QL NAA+PROBE: SIGNIFICANT CHANGE UP
GLUCOSE SERPL-MCNC: 130 MG/DL — HIGH (ref 70–99)
HADV DNA SPEC QL NAA+PROBE: SIGNIFICANT CHANGE UP
HCOV PNL SPEC NAA+PROBE: SIGNIFICANT CHANGE UP
HCT VFR BLD CALC: 30 % — LOW (ref 34.5–45)
HGB BLD-MCNC: 10.2 G/DL — LOW (ref 11.5–15.5)
HMPV RNA SPEC QL NAA+PROBE: SIGNIFICANT CHANGE UP
HPIV1 RNA SPEC QL NAA+PROBE: SIGNIFICANT CHANGE UP
HPIV2 RNA SPEC QL NAA+PROBE: SIGNIFICANT CHANGE UP
HPIV3 RNA SPEC QL NAA+PROBE: SIGNIFICANT CHANGE UP
HPIV4 RNA SPEC QL NAA+PROBE: SIGNIFICANT CHANGE UP
IMM GRANULOCYTES NFR BLD AUTO: 0.6 % — SIGNIFICANT CHANGE UP (ref 0–1.5)
LYMPHOCYTES # BLD AUTO: 0.3 K/UL — LOW (ref 1–3.3)
LYMPHOCYTES # BLD AUTO: 2.3 % — LOW (ref 13–44)
MAGNESIUM SERPL-MCNC: 1.7 MG/DL — SIGNIFICANT CHANGE UP (ref 1.6–2.6)
MCHC RBC-ENTMCNC: 33.3 PG — SIGNIFICANT CHANGE UP (ref 27–34)
MCHC RBC-ENTMCNC: 34 GM/DL — SIGNIFICANT CHANGE UP (ref 32–36)
MCV RBC AUTO: 98 FL — SIGNIFICANT CHANGE UP (ref 80–100)
MONOCYTES # BLD AUTO: 1.05 K/UL — HIGH (ref 0–0.9)
MONOCYTES NFR BLD AUTO: 8 % — SIGNIFICANT CHANGE UP (ref 2–14)
NEUTROPHILS # BLD AUTO: 11.7 K/UL — HIGH (ref 1.8–7.4)
NEUTROPHILS NFR BLD AUTO: 88.8 % — HIGH (ref 43–77)
NRBC # BLD: 0 /100 WBCS — SIGNIFICANT CHANGE UP (ref 0–0)
PLATELET # BLD AUTO: 140 K/UL — LOW (ref 150–400)
POTASSIUM SERPL-MCNC: 3.5 MMOL/L — SIGNIFICANT CHANGE UP (ref 3.5–5.3)
POTASSIUM SERPL-SCNC: 3.5 MMOL/L — SIGNIFICANT CHANGE UP (ref 3.5–5.3)
PROCALCITONIN SERPL-MCNC: 0.06 NG/ML — SIGNIFICANT CHANGE UP
PROT SERPL-MCNC: 6.2 G/DL — SIGNIFICANT CHANGE UP (ref 6–8.3)
RAPID RVP RESULT: SIGNIFICANT CHANGE UP
RBC # BLD: 3.06 M/UL — LOW (ref 3.8–5.2)
RBC # FLD: 13.3 % — SIGNIFICANT CHANGE UP (ref 10.3–14.5)
RSV RNA SPEC QL NAA+PROBE: SIGNIFICANT CHANGE UP
RV+EV RNA SPEC QL NAA+PROBE: SIGNIFICANT CHANGE UP
SARS-COV-2 IGG+IGM SERPL QL IA: >250 U/ML — HIGH
SARS-COV-2 IGG+IGM SERPL QL IA: POSITIVE
SARS-COV-2 RNA SPEC QL NAA+PROBE: SIGNIFICANT CHANGE UP
SARS-COV-2 RNA SPEC QL NAA+PROBE: SIGNIFICANT CHANGE UP
SODIUM SERPL-SCNC: 135 MMOL/L — SIGNIFICANT CHANGE UP (ref 135–145)
SPECIMEN SOURCE: SIGNIFICANT CHANGE UP
WBC # BLD: 13.17 K/UL — HIGH (ref 3.8–10.5)
WBC # FLD AUTO: 13.17 K/UL — HIGH (ref 3.8–10.5)

## 2021-07-10 PROCEDURE — 74176 CT ABD & PELVIS W/O CONTRAST: CPT | Mod: 26

## 2021-07-10 PROCEDURE — 71250 CT THORAX DX C-: CPT | Mod: 26

## 2021-07-10 PROCEDURE — 99232 SBSQ HOSP IP/OBS MODERATE 35: CPT

## 2021-07-10 RX ORDER — ALPRAZOLAM 0.25 MG
0.5 TABLET ORAL ONCE
Refills: 0 | Status: DISCONTINUED | OUTPATIENT
Start: 2021-07-10 | End: 2021-07-10

## 2021-07-10 RX ORDER — VANCOMYCIN HCL 1 G
125 VIAL (EA) INTRAVENOUS EVERY 6 HOURS
Refills: 0 | Status: DISCONTINUED | OUTPATIENT
Start: 2021-07-10 | End: 2021-07-13

## 2021-07-10 RX ORDER — MAGNESIUM SULFATE 500 MG/ML
2 VIAL (ML) INJECTION ONCE
Refills: 0 | Status: COMPLETED | OUTPATIENT
Start: 2021-07-10 | End: 2021-07-10

## 2021-07-10 RX ORDER — LACTOBACILLUS ACIDOPHILUS 100MM CELL
1 CAPSULE ORAL
Refills: 0 | Status: DISCONTINUED | OUTPATIENT
Start: 2021-07-10 | End: 2021-07-13

## 2021-07-10 RX ORDER — ONDANSETRON 8 MG/1
4 TABLET, FILM COATED ORAL EVERY 8 HOURS
Refills: 0 | Status: DISCONTINUED | OUTPATIENT
Start: 2021-07-10 | End: 2021-07-13

## 2021-07-10 RX ORDER — IOHEXOL 300 MG/ML
30 INJECTION, SOLUTION INTRAVENOUS ONCE
Refills: 0 | Status: COMPLETED | OUTPATIENT
Start: 2021-07-10 | End: 2021-07-10

## 2021-07-10 RX ADMIN — Medication 650 MILLIGRAM(S): at 21:21

## 2021-07-10 RX ADMIN — ENOXAPARIN SODIUM 40 MILLIGRAM(S): 100 INJECTION SUBCUTANEOUS at 12:14

## 2021-07-10 RX ADMIN — Medication 0.5 MILLIGRAM(S): at 21:22

## 2021-07-10 RX ADMIN — Medication 0.5 MILLIGRAM(S): at 01:27

## 2021-07-10 RX ADMIN — ONDANSETRON 4 MILLIGRAM(S): 8 TABLET, FILM COATED ORAL at 01:27

## 2021-07-10 RX ADMIN — Medication 50 GRAM(S): at 14:37

## 2021-07-10 RX ADMIN — Medication 650 MILLIGRAM(S): at 07:30

## 2021-07-10 RX ADMIN — Medication 650 MILLIGRAM(S): at 05:31

## 2021-07-10 RX ADMIN — Medication 650 MILLIGRAM(S): at 22:20

## 2021-07-10 RX ADMIN — IOHEXOL 30 MILLILITER(S): 300 INJECTION, SOLUTION INTRAVENOUS at 17:18

## 2021-07-10 RX ADMIN — PANTOPRAZOLE SODIUM 40 MILLIGRAM(S): 20 TABLET, DELAYED RELEASE ORAL at 05:31

## 2021-07-10 RX ADMIN — Medication 125 MILLIGRAM(S): at 20:48

## 2021-07-10 RX ADMIN — Medication 1 TABLET(S): at 21:23

## 2021-07-10 RX ADMIN — Medication 1 TABLET(S): at 12:26

## 2021-07-10 RX ADMIN — Medication 125 MILLIGRAM(S): at 12:14

## 2021-07-10 NOTE — CONSULT NOTE ADULT - SUBJECTIVE AND OBJECTIVE BOX
HPI:   Patient is a 81y female with IBS, urethral caruncle who saw a urologist a week ago to have the caruncle evaluated though it was not hurting. She had a ua and culture obtained despite no gu symptoms, found to have pansensitive ecoli in the urine and commenced vantin 4 days ago. Yesterday she developed fever, chills, worse diarrhea than her baseline hence came to hospital. She has no gu symptoms, no flank pain, no urgency or frequency no dysuria no gross hematuria. She does however have malodorous stool that is similar to the cdif she had and has some cramps. She is nauseated.     REVIEW OF SYSTEMS:  All other review of systems negative (Comprehensive ROS)    PAST MEDICAL & SURGICAL HISTORY:  Irritable bowel syndrome, unspecified type    H/O:   x2    S/P correction of deviated nasal septum        Allergies    Macrobid (Unknown)  sulfa drugs (Unknown)    Intolerances        Antimicrobials Day #  :  levoFLOXacin IVPB 500 milliGRAM(s) IV Intermittent every 24 hours    Other Medications:  acetaminophen   Tablet .. 650 milliGRAM(s) Oral every 6 hours PRN  ALPRAZolam 0.5 milliGRAM(s) Oral at bedtime  enoxaparin Injectable 40 milliGRAM(s) SubCutaneous daily  lactobacillus acidophilus 1 Tablet(s) Oral three times a day with meals  ondansetron Injectable 4 milliGRAM(s) IV Push every 8 hours PRN  pantoprazole    Tablet 40 milliGRAM(s) Oral before breakfast      FAMILY HISTORY:      SOCIAL HISTORY:  Smoking: [ ]Yes [x ]No  ETOH: [ ]Yes x[ ]No  Drug Use: [ ]Yes [ x]No   [ ] Single[ ]    T(F): 101.6 (07-10-21 @ 09:39), Max: 102.7 (07-10-21 @ 05:11)  HR: 98 (07-10-21 @ 05:11)  BP: 127/55 (07-10-21 @ 05:11)  RR: 20 (07-10-21 @ 06:20)  SpO2: 98% (07-10-21 @ 06:20)  Wt(kg): --    PHYSICAL EXAM:  General: alert, no acute distress  Eyes:  anicteric, no conjunctival injection, no discharge  Oropharynx: no lesions or injection 	  Neck: supple, without adenopathy  Lungs: clear to auscultation  Heart: regular rate and rhythm; no murmur, rubs or gallops  Abdomen: soft, mildly distended, nontender, without mass or organomegaly, no cvat  Skin: no lesions  Extremities: no clubbing, cyanosis, or edema  Neurologic: alert, oriented, moves all extremities    LAB RESULTS:                        10.2   13.17 )-----------( 140      ( 10 Jul 2021 06:34 )             30.0     07-10    135  |  104  |  11  ----------------------------<  130<H>  3.5   |  20<L>  |  0.77    Ca    7.9<L>      10 Jul 2021 06:34  Mg     1.7     07-10    TPro  6.2  /  Alb  3.1<L>  /  TBili  0.6  /  DBili  x   /  AST  11  /  ALT  22  /  AlkPhos  56  07-10    LIVER FUNCTIONS - ( 10 Jul 2021 06:34 )  Alb: 3.1 g/dL / Pro: 6.2 g/dL / ALK PHOS: 56 U/L / ALT: 22 U/L / AST: 11 U/L / GGT: x           Urinalysis Basic - ( 2021 20:30 )    Color: Yellow / Appearance: Slightly Turbid / S.020 / pH: x  Gluc: x / Ketone: Negative  / Bili: Negative / Urobili: Negative   Blood: x / Protein: Negative / Nitrite: Negative   Leuk Esterase: Negative / RBC: 0-4 /HPF / WBC 0-2 /HPF   Sq Epi: x / Non Sq Epi: Many / Bacteria: Few /HPF        MICROBIOLOGY:  RECENT CULTURES:        RADIOLOGY REVIEWED:          Impression: 80 y/o woman who was seen by urologist last week for evaluation of known urethral caruncle, no symptoms and no fever. She had a urine culture grow pansensitive ecoli(i spoke to [facility] lab and confirmed) and was given vantin. She took it for 3 days and then developed fever, chills, worse diarrhea than baseline. She continues to have no gu symptoms. I suspect she has cdif from taking the vantin for what is assymptomatic bactiuria. No other source of infection is apparent at present.     Recommendations:  will start po vancomycin  will stop levaquin  f/u cultures, check stool cdif, gi pcr, culture and o and p to be complete  monitor abdomen exam, stool output, volume status.

## 2021-07-10 NOTE — PROGRESS NOTE ADULT - ASSESSMENT
81 female with no significant past medical history other than IBS admitted with a UTI now with diarrhea.    # Diarrhea with leukocytosis and Fever  pt with hx of IBS, foul smelling diarrhea this am  ID consult pending  pt has been febrile, continue tylenol prn, RVP negative  follow up GI pcr, c diff toxin, stool culture, O&P  maintain contact precautions  monitor fever curve, leukocytosis    # Pan Sensitive E Coli UTI  pt found with UTI started on ab 81 female with no significant past medical history other than IBS admitted with a UTI now with diarrhea.    # Diarrhea with leukocytosis and Fever  pt with hx of IBS, foul smelling diarrhea this am  pt has been febrile with inc WBC, continue tylenol prn, RVP negative  follow up GI pcr, c diff toxin, stool culture, O&P  official ID consult pending  start oral vancomycin, high suspicion for Cdiff, stop IV levaquin  maintain contact precautions  monitor fever curve, leukocytosis    # Pan Sensitive E Coli UTI  pt with some urinary urgency, but no dysuria or hematuria for past several weeks  saw urology and routine UA/culture revealed pan sensitive E coli  initially prescribed vantin several days ago  stopped IV levaquin and started po vanco for suspicion of Cdiff    # Hypokalemia  replete as needed  Mg 1.7  follow BMP    # Prophylactic Measure  lovenox, protonix   81 female with no significant past medical history other than IBS admitted with a UTI now with diarrhea.    # Diarrhea with leukocytosis and Fever  pt with hx of IBS, foul smelling diarrhea this am  pt has been febrile with inc WBC, continue tylenol prn, RVP negative  follow up GI pcr, c diff toxin, stool culture, O&P  official ID consult pending  start oral vancomycin, high suspicion for Cdiff, stop IV levaquin  maintain contact precautions  monitor fever curve, leukocytosis    # Pan Sensitive E Coli UTI  pt with some urinary urgency (baseline), but no dysuria or hematuria for past several weeks  saw urology and routine UA/culture revealed pan sensitive E coli  initially prescribed vantin several days ago however patient with asymptomatic bacteruria   stopped IV levaquin and started po vanco for suspicion of Cdiff    # Hypokalemia  #Hypomagnesemia  replete as needed  Mg 1.7  follow BMP    # Prophylactic Measure  lovenox, protonix

## 2021-07-10 NOTE — DIETITIAN INITIAL EVALUATION ADULT. - PERTINENT MEDS FT
MEDICATIONS  (STANDING):  ALPRAZolam 0.5 milliGRAM(s) Oral at bedtime  enoxaparin Injectable 40 milliGRAM(s) SubCutaneous daily  lactobacillus acidophilus 1 Tablet(s) Oral three times a day with meals  pantoprazole    Tablet 40 milliGRAM(s) Oral before breakfast  vancomycin    Solution 125 milliGRAM(s) Oral every 6 hours    MEDICATIONS  (PRN):  acetaminophen   Tablet .. 650 milliGRAM(s) Oral every 6 hours PRN Temp greater or equal to 38C (100.4F), Mild Pain (1 - 3)  ondansetron Injectable 4 milliGRAM(s) IV Push every 8 hours PRN Nausea and/or Vomiting

## 2021-07-10 NOTE — DIETITIAN INITIAL EVALUATION ADULT. - PERSON TAUGHT/METHOD
diarrhea/IBS nutrition therapy. Adequate oral hydration/verbal instruction/patient instructed/teach back - (Patient repeats in own words)

## 2021-07-10 NOTE — DIETITIAN INITIAL EVALUATION ADULT. - ORAL INTAKE PTA/DIET HISTORY
Pt reports difficulty tolerating a variety of foods due to hx of IBS (since she was 20yo). Typically eats a very bland diet. Avoids lactose/dairy, cannot tolerate spices (pepper), no tomato sauce, no garlic, no caffeine. States that she otherwise eats to her satisfaction. Bowel frequency varies, but mostly loose. Reports drinking a lot of water/tea throughout the day. Denies chewing/swallowing difficulty.

## 2021-07-10 NOTE — DIETITIAN INITIAL EVALUATION ADULT. - OTHER INFO
81 female with no significant past medical history other than IBS admitted with a UTI now with diarrhea.     Pt started on vancomycin, +lactobacillus acidophilous. Awaiting stool culture for C.diff. Pt tolerating diet with report of fair appetite, observed eating Mexican toast this morning. Requesting herbal tea.  pounds, states that she has been stable at this weight for the past 3 years. Dietary needs recorded and updated with nutrition office. States that she moved her bowels 3 times yesterday (all loose). Explained importance of rehydrating with frequent loose BM's; suggested Pedialyte for electrolyte repletion +high K+ foods. Consider low fiber diet if frequent loose BM's persist. Pt receptive to all recommendations.

## 2021-07-10 NOTE — PROGRESS NOTE ADULT - ATTENDING COMMENTS
81 female with no significant past medical history other than IBS admitted with a UTI now with diarrhea.    # Diarrhea with leukocytosis and Fever  - patient with foul smelling diarrhea this AM, history of c diff  - patient was on vantin outpatient for UTI x4 days prior to admission  - high suspicion for c diff, will start on oral vanco, d/c levaquin  - follow up GI pcr, c diff toxin, stool culture, O&P  - ID recommendations appreciated 81 female with no significant past medical history other than IBS admitted with a UTI now with diarrhea.    # Diarrhea with leukocytosis and Fever  - patient with foul smelling diarrhea this AM, history of c diff  - patient was on vantin outpatient for UTI x4 days prior to admission  - patient also with uptrending WBC and continued to spike high fevers despite being on abx  - high suspicion for c diff, will start on oral vanco, d/c levaquin   - follow up GI pcr, c diff toxin, stool culture, O&P  - ID recommendations appreciated

## 2021-07-10 NOTE — DIETITIAN INITIAL EVALUATION ADULT. - ADD RECOMMEND
Lactose intolerance added to chart. No spices- black pepper, garlic, tomato sauce, caffeine. Consider low fiber diet if symptoms do not improve. Encourage increased oral hydration. Monitor electrolytes with ongoing diarrhea.

## 2021-07-10 NOTE — PATIENT PROFILE ADULT - STATED REASON FOR ADMISSION
Found out she had a UTI and was on PO antibiotics but was at home experiencing chills and fever and decided to come to ER

## 2021-07-10 NOTE — DIETITIAN INITIAL EVALUATION ADULT. - PERTINENT LABORATORY DATA
Date: 10 Jul 2021 06:34  Hemoglobin 10.2   Hematocrit 30.0     Date: 07-10  Sodium 135  Potassium 3.5  Glucose Serum 130<H>  BUN 11      Creatinine    ACCUCHECK

## 2021-07-10 NOTE — DIETITIAN INITIAL EVALUATION ADULT. - CALCULATED FROM (G/KG)
Nephrology Consult Note  Patient's Name: Fer Amin  12:17 PM  12/26/2020    Nephrologist: Dr. Joe Verdin    Reason for Consult:  AGUSTIN & Hypernatremia  Requesting Physician:  Sigrid Morelos MD    Chief Complaint:  AMS and SOB    History Obtained From:  past medical records    History of Present Ilness:  Per 12/22/20 Note:  Fer Amin is a 60 y.o. 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 He had a recent hospitalization 11/30-12/3/2020 for sepsis and severe malnutrition. He had recently had a stroke and was at a rehab, where he suffered a fall with subsequent left shoulder pain and went to ER for evaluation and was found to have a possible uti with sepsis. Urine culture revealed Enterococcus faecalis. During that hospitalization, he pulled out his NG tube, a video swallow was completed, of which he passed. He was sent to the ER this am, 12/22/2020, with a change in mental status and shortness of breath. There was no fever. Pt was dehydrated. Significant labs included:  Wbc 32, Cr 5.3, Na 160, AST 81, , procalcitonin 1.46, and lactic acid 3.2. He was treated with ivf, vanc, ceftriaxone, and cefepime. SARS/ CoV2 +positive . Vitals upon presentation:  T 96.9, R 22, P 120, BP 98/75, 78% on room air, he was placed on heated high flow oxygen 60 L with FiO2 100. He has been nonverbal and non communicating. 12/23/2020: Physical exam not performed d/t +COVID 19 Infection. Palliative care met with wife who wishes no CPR but wants infection treated. 12/24/20:  No in-person visit d/t +COVID 19 isolation.    12/25: pt not examined due to covid, chart  Reviewed  12/26: pt not examined due to covid, chart was reviewed    Past Medical History:   Diagnosis Date    Allergic rhinitis     had allergy shots    Cancer ([facility] [address]     leukemia    Gout     Thyroid disease        Past Surgical History:   Procedure Laterality Date  BACK SURGERY      for spinal stenosis [facility]    IR MECHANICAL ART THROMBECTOMY INTRACRANIAL  10/23/2020    IR MECHANICAL ART THROMBECTOMY INTRACRANIAL 10/23/2020 [facility]    TUMOR EXCISION      melanoma on the back       Family History   Problem Relation Age of Onset    Coronary Art Dis Father         aged 63    Breast Cancer Mother     Mult Sclerosis Sister         reports that he has never smoked. He has never used smokeless tobacco. He reports that he does not drink alcohol or use drugs.     Allergies:  Bactrim [sulfamethoxazole-trimethoprim]    Current Medications:        vancomycin 1000 mg IVPB in 250 mL D5W addavial, Once      dexamethasone (DECADRON) tablet 3 mg, Daily      morphine (PF) injection 1 mg, Q2H PRN      dextrose 5 % solution, Continuous      heparin flush 100 UNIT/ML injection 100 Units, PRN      mineral oil-hydrophilic petrolatum (HYDROPHOR) ointment, BID    And      mineral oil-hydrophilic petrolatum (HYDROPHOR) ointment, TID PRN      cefepime (MAXIPIME) 1 g IVPB extended (mini-bag), Q24H    And      0.9 % sodium chloride infusion admixture, Q24H      sodium chloride flush 0.9 % injection 10 mL, 2 times per day      sodium chloride flush 0.9 % injection 10 mL, PRN      heparin (porcine) injection 5,000 Units, 3 times per day      levothyroxine (SYNTHROID) tablet 50 mcg, Daily      aspirin chewable tablet 81 mg, Daily      bisacodyl (DULCOLAX) suppository 10 mg, Daily PRN      magnesium hydroxide (MILK OF MAGNESIA) 400 MG/5ML suspension 30 mL, Daily PRN      traMADol (ULTRAM) tablet 50 mg, Q4H PRN      sertraline (ZOLOFT) tablet 50 mg, Daily      acetaminophen (TYLENOL) tablet 650 mg, Q6H PRN    Or      acetaminophen (TYLENOL) suppository 650 mg, Q6H PRN      Vitamin D (CHOLECALCIFEROL) tablet 2,000 Units, Daily      vancomycin (VANCOCIN) intermittent dosing (placeholder), RX Placeholder        Review of Systems: Pertinent items are noted in HPI. Physical exam:   Constitutional:    Vitals: VITALS:  /74   Pulse 103   Temp 96.8 °F (36 °C) (Temporal)   Resp 18   Ht 6' 2\" (1.88 m)   Wt 185 lb (83.9 kg)   SpO2 95%   BMI 23.75 kg/m²   24HR INTAKE/OUTPUT:      Intake/Output Summary (Last 24 hours) at 12/26/2020 1217  Last data filed at 12/26/2020 7070  Gross per 24 hour   Intake    Output 2100 ml   Net -2100 ml     URINARY CATHETER OUTPUT ([place]):  Urethral Catheter-Output (mL): 1000 mL     PE not performed-pt is COVID-19 +positive.     Data:   Labs:  CBC:   Lab Results   Component Value Date    WBC 21.8 12/26/2020    RBC 4.57 12/26/2020    HGB 13.4 12/26/2020    HCT 42.9 12/26/2020    MCV 93.9 12/26/2020    MCH 29.3 12/26/2020    MCHC 31.2 12/26/2020    RDW 14.2 12/26/2020     12/26/2020    MPV 13.7 12/26/2020     CBC with Differential:    Lab Results   Component Value Date    WBC 21.8 12/26/2020    RBC 4.57 12/26/2020    HGB 13.4 12/26/2020    HCT 42.9 12/26/2020     12/26/2020    MCV 93.9 12/26/2020    MCH 29.3 12/26/2020    MCHC 31.2 12/26/2020    RDW 14.2 12/26/2020    SEGSPCT 21 04/27/2012    METASPCT 0.9 12/24/2020    LYMPHOPCT 3.0 12/26/2020    MONOPCT 4.8 12/26/2020    BASOPCT 0.2 12/26/2020    MONOSABS 1.05 12/26/2020    LYMPHSABS 0.66 12/26/2020    EOSABS 0.30 12/26/2020    BASOSABS 0.05 12/26/2020     CMP:    Lab Results   Component Value Date     12/26/2020    K 3.9 12/26/2020     12/26/2020    CO2 22 12/26/2020     12/26/2020    CREATININE 2.4 12/26/2020    GFRAA 33 12/26/2020    LABGLOM 27 12/26/2020    GLUCOSE 112 12/26/2020    GLUCOSE 112 04/27/2012    PROT 5.9 12/26/2020    LABALBU 2.6 12/26/2020    LABALBU 4.8 04/27/2012    CALCIUM 9.0 12/26/2020    BILITOT 1.0 12/26/2020    ALKPHOS 244 12/26/2020    AST 51 12/26/2020    ALT 65 12/26/2020     BMP:    Lab Results   Component Value Date     12/26/2020    K 3.9 12/26/2020     12/26/2020 CO2 22 12/26/2020     12/26/2020    LABALBU 2.6 12/26/2020    LABALBU 4.8 04/27/2012    CREATININE 2.4 12/26/2020    CALCIUM 9.0 12/26/2020    GFRAA 33 12/26/2020    LABGLOM 27 12/26/2020    GLUCOSE 112 12/26/2020    GLUCOSE 112 04/27/2012     Calcium:    Lab Results   Component Value Date    CALCIUM 9.0 12/26/2020     Ionized Calcium:  No results found for: IONCA  Magnesium:    Lab Results   Component Value Date    MG 1.9 11/30/2020     Phosphorus:    Lab Results   Component Value Date    PHOS 3.9 10/31/2020     LDH:    Lab Results   Component Value Date     12/22/2020     Uric Acid:    Lab Results   Component Value Date    LABURIC 5.4 03/10/2017    URICACID 4.8 07/12/2016     PT/INR:    Lab Results   Component Value Date    PROTIME 14.6 12/22/2020    INR 1.3 12/22/2020     PTT:    Lab Results   Component Value Date    APTT 28.2 12/22/2020   [APTT}  Last 3 Troponin:    Lab Results   Component Value Date    TROPONINI 0.10 12/22/2020    TROPONINI <0.01 11/30/2020     U/A:    Lab Results   Component Value Date    COLORU Yellow 12/22/2020    PROTEINU TRACE 12/22/2020    PHUR 5.0 12/22/2020    WBCUA 1-3 12/22/2020    RBCUA 0-1 12/22/2020    BACTERIA MODERATE 12/22/2020    CLARITYU Clear 12/22/2020    SPECGRAV >=1.030 12/22/2020    LEUKOCYTESUR TRACE 12/22/2020    UROBILINOGEN 1.0 12/22/2020    BILIRUBINUR MODERATE 12/22/2020    BLOODU SMALL 12/22/2020    GLUCOSEU 100 12/22/2020    AMORPHOUS FEW 12/22/2020     ABG:    Lab Results   Component Value Date    PH 7.403 12/22/2020    PCO2 33.5 12/22/2020    PO2 78.4 12/22/2020    HCO3 20.4 12/22/2020    BE -3.3 12/22/2020    O2SAT 95.3 12/22/2020     HgBA1c:    Lab Results   Component Value Date    LABA1C 5.3 10/25/2020     Microalbumen/Creatinine ratio:  No components found for: RUCREAT  FLP:    Lab Results   Component Value Date    TRIG 101 10/25/2020    HDL 48 10/25/2020    LDLCALC 91 10/25/2020    LABVLDL 20 10/25/2020     TSH:    Lab Results Component Value Date    TSH 0.958 10/29/2020     VITAMIN B12: No components found for: B12  FOLATE:    Lab Results   Component Value Date    FOLATE 19.3 03/16/2015     IRON:  No results found for: IRON  Iron Saturation:  No components found for: PERCENTFE  TIBC:  No results found for: TIBC  FERRITIN:    Lab Results   Component Value Date    FERRITIN 1,489 12/22/2020     RPR:  No results found for: RPR  AMYLASE:  No results found for: AMYLASE  LIPASE:  No results found for: LIPASE  Fibrinogen Level:  No components found for: FIB  Urine Toxicology:  No components found for: IAMMENTA, IBARBIT, IBENZO, ICOCAINE, IMARTHC, IOPIATES, IPHENCYC     Imaging:  EXAMINATION:   CT OF THE HEAD WITHOUT CONTRAST  12/22/2020 3:53 am   TECHNIQUE:   CT of the head was performed without the administration of intravenous   contrast. Dose modulation, iterative reconstruction, and/or weight based   adjustment of the mA/kV was utilized to reduce the radiation dose to as low   as reasonably achievable. COMPARISON:   None. HISTORY:   ORDERING SYSTEM PROVIDED HISTORY: AMS   TECHNOLOGIST PROVIDED HISTORY:   Reason for exam:->AMS   Has a \"code stroke\" or \"stroke alert\" been called? ->No   What reading provider will be dictating this exam?->CRC   FINDINGS:   Stable extensive right-sided encephalomalacia likely with some minimal   dystrophic calcification.  No acute hemorrhage, mass or midline shift. Stable caliber of ventricles and sulci.  Unremarkable bony calvarium.       Impression   No new abnormal findings.  Encephalomalacia on the right as before. EXAMINATION:   CT OF THE CHEST WITHOUT CONTRAST 12/22/2020 3:53 am   TECHNIQUE:   CT of the chest was performed without the administration of intravenous   contrast. Multiplanar reformatted images are provided for review.  Dose   modulation, iterative reconstruction, and/or weight based adjustment of the   mA/kV was utilized to reduce the radiation dose to as low as reasonably achievable. COMPARISON:   October 7, 2009   HISTORY:   ORDERING SYSTEM PROVIDED HISTORY: sob, hypoxia   TECHNOLOGIST PROVIDED HISTORY:   Reason for exam:->sob, hypoxia   What reading provider will be dictating this exam?->CRC   FINDINGS:   There is left lower lobe pneumonia and there is opacity at the right lower   lobe which is most likely atelectasis. Heart size is normal with some coronary artery calcification.  The caliber of   the thoracic aorta is within normal limits.  No mediastinal adenopathy is   present. No active process evident in the upper abdomen.  Chest wall is unremarkable.       Impression   Left lower lobe pneumonia.  Opacity at the right lower lobe is likely   atelectasis. Narrative   EXAMINATION:   CT OF THE ABDOMEN AND PELVIS WITHOUT CONTRAST 12/22/2020 3:53 am   TECHNIQUE:   CT of the abdomen and pelvis was performed without the administration of   intravenous contrast. Multiplanar reformatted images are provided for review. Dose modulation, iterative reconstruction, and/or weight based adjustment of   the mA/kV was utilized to reduce the radiation dose to as low as reasonably   achievable. COMPARISON:   October 7 2009   HISTORY:   ORDERING SYSTEM PROVIDED HISTORY: renal failure, weakness   TECHNOLOGIST PROVIDED HISTORY:   Reason for exam:->renal failure, weakness   Additional Contrast?->None   What reading provider will be dictating this exam?->CRC   FINDINGS:   Lower Chest: Reported separately. Organs: Gallbladder, liver, spleen, pancreas and adrenal glands demonstrate   nothing active. Esther Mimes is no evidence of hydronephrosis or renal parenchymal   wasting.  No space-occupying renal lesions or calcified stones are evident. GI/Bowel: Normal appendix. Esther Mimes is sigmoid diverticulosis. Pelvis: Nothing active. Peritoneum/Retroperitoneum: Nothing active.    Bones/Soft Tissues: Nothing active.       Impression No evidence of urinary tract obstruction or other demonstrable specific cause   of renal failure.  See above. EXAMINATION:   ONE XRAY VIEW OF THE CHEST   12/22/2020 3:45 am   COMPARISON:   30 November 2020   HISTORY:   ORDERING SYSTEM PROVIDED HISTORY: ams   TECHNOLOGIST PROVIDED HISTORY:   Reason for exam:->ams   What reading provider will be dictating this exam?->CRC   FINDINGS:   Left lower lobe pneumonia.  Heart and pulmonary vascularity are normal.   Neither costophrenic angle is blunted.       Impression   Left lower lobe pneumonia. Assessment & Plan:   1. AGUSTIN in the setting of dehydration, combined with COVID-19 infection. baseline serum Cr 0.6-0.8 mg/dl with eGFR=>60 ml/min. Serum Cr OA   4.9-->5.3-->5.5 ->6.0-->4.9>3.5>2.4  CT scan reveals no hydro, no calculi. FENa - 0.4% supports pre-renal etiology  PVR, MCR - 39.8  Pts wife conveys that pt does not want dialysis  Continue IVF D5W    2. Hypernatremia in the setting of dehydration. Na+160 OA-->156->155->153>150  Continue ivf  Monitor Q 12 hrs  Doubt pt getting the fluids that are ordered    3. DM type II  Controlled as evidenced by HgbA1c 5.3 on 10/25/2020  Primary following    4. Leukocytosis  Wbc 32.2>23>21  No fever  LLL infiltrates  Received vanc, cefepime, ceftriaxone in ER  On Vanc  ID following. 5. Altered Mental Status  Primary following     6. COVID 19 Infection  Received steroids  ID following.        Thank you Dr. Rosanna Mcghee MD for allowing us to participate in care of RosemarySHANIA Velásquez-CNS  3:15 PM  12/26/2020 53

## 2021-07-10 NOTE — PROGRESS NOTE ADULT - SUBJECTIVE AND OBJECTIVE BOX
Patient is a 81y old  Female who presents with a chief complaint of UTI (2021 22:46)    Patient seen and examined at bedside.  pt with episode of foul smelling loose BM this am      ALLERGIES:  Macrobid (Unknown)  sulfa drugs (Unknown)        Vital Signs Last 24 Hrs  T(F): 101.6 (10 Jul 2021 09:39), Max: 102.7 (10 Jul 2021 05:11)  HR: 98 (10 Jul 2021 05:11) (79 - 98)  BP: 127/55 (10 Jul 2021 05:11) (94/55 - 127/67)  RR: 20 (10 Jul 2021 06:20) (15 - 25)  SpO2: 98% (10 Jul 2021 06:20) (94% - 100%)  I&O's Summary    MEDICATIONS:  acetaminophen   Tablet .. 650 milliGRAM(s) Oral every 6 hours PRN  ALPRAZolam 0.5 milliGRAM(s) Oral at bedtime  enoxaparin Injectable 40 milliGRAM(s) SubCutaneous daily  lactobacillus acidophilus 1 Tablet(s) Oral daily  levoFLOXacin IVPB 500 milliGRAM(s) IV Intermittent every 24 hours  ondansetron Injectable 4 milliGRAM(s) IV Push every 8 hours PRN  pantoprazole    Tablet 40 milliGRAM(s) Oral before breakfast      PHYSICAL EXAM:  General: NAD, A/O x 3  ENT: MMM, no thrush  Neck: Supple, No JVD  Lungs: Clear to auscultation bilaterally, non labored breathing  Cardio: RRR, S1/S2, No murmurs  Abdomen: Soft, Nontender, Nondistended; Bowel sounds present  Extremities: No cyanosis, No edema    LABS:                        10.2   13.17 )-----------( 140      ( 10 Jul 2021 06:34 )             30.0     07-10    135  |  104  |  11  ----------------------------<  130  3.5   |  20  |  0.77    Ca    7.9      10 Jul 2021 06:34  Mg     1.7     07-10    TPro  6.2  /  Alb  3.1  /  TBili  0.6  /  DBili  x   /  AST  11  /  ALT  22  /  AlkPhos  56  07-10    eGFR if Non African American: 73 mL/min/1.73M2 (07-10-21 @ 06:34)  eGFR if African American: 85 mL/min/1.73M2 (07-10-21 @ 06:34)    PT/INR - ( 2021 20:30 )   PT: 12.2 sec;   INR: 1.01 ratio         PTT - ( 2021 20:30 )  PTT:27.5 sec  Lactate, Blood: 0.9 mmol/L ( @ 20:30)                          Urinalysis Basic - ( 2021 20:30 )    Color: Yellow / Appearance: Slightly Turbid / S.020 / pH: x  Gluc: x / Ketone: Negative  / Bili: Negative / Urobili: Negative   Blood: x / Protein: Negative / Nitrite: Negative   Leuk Esterase: Negative / RBC: 0-4 /HPF / WBC 0-2 /HPF   Sq Epi: x / Non Sq Epi: Many / Bacteria: Few /HPF        COVID-19 PCR: NotDetec (21 @ 22:09)      RADIOLOGY & ADDITIONAL TESTS:    Care Discussed with Consultants/Other Providers:    Patient is a 81y old  Female who presents with a chief complaint of UTI (2021 22:46)    Patient seen and examined at bedside. Patient admits to mild abdominal pain, related to IBS but currently a bit worse. Patient also states she had episode of foul smelling loose BM this am. She usually gets diarrhea with abx due to IBS however states she has history of c diff and this episode felt like that      ALLERGIES:  Macrobid (Unknown)  sulfa drugs (Unknown)        Vital Signs Last 24 Hrs  T(F): 101.6 (10 Jul 2021 09:39), Max: 102.7 (10 Jul 2021 05:11)  HR: 98 (10 Jul 2021 05:11) (79 - 98)  BP: 127/55 (10 Jul 2021 05:11) (94/55 - 127/67)  RR: 20 (10 Jul 2021 06:20) (15 - 25)  SpO2: 98% (10 Jul 2021 06:20) (94% - 100%)  I&O's Summary    MEDICATIONS:  acetaminophen   Tablet .. 650 milliGRAM(s) Oral every 6 hours PRN  ALPRAZolam 0.5 milliGRAM(s) Oral at bedtime  enoxaparin Injectable 40 milliGRAM(s) SubCutaneous daily  lactobacillus acidophilus 1 Tablet(s) Oral daily  levoFLOXacin IVPB 500 milliGRAM(s) IV Intermittent every 24 hours  ondansetron Injectable 4 milliGRAM(s) IV Push every 8 hours PRN  pantoprazole    Tablet 40 milliGRAM(s) Oral before breakfast      PHYSICAL EXAM:  General: NAD, A/O x 3  ENT: MMM, no thrush  Neck: Supple, No JVD  Lungs: Clear to auscultation bilaterally, non labored breathing  Cardio: RRR, S1/S2, No murmurs  Abdomen: Soft, mild tenderness to palpation, Nondistended; Bowel sounds present  Extremities: No cyanosis, No edema    LABS:                        10.2   13.17 )-----------( 140      ( 10 Jul 2021 06:34 )             30.0     07-10    135  |  104  |  11  ----------------------------<  130  3.5   |  20  |  0.77    Ca    7.9      10 Jul 2021 06:34  Mg     1.7     07-10    TPro  6.2  /  Alb  3.1  /  TBili  0.6  /  DBili  x   /  AST  11  /  ALT  22  /  AlkPhos  56  07-10    eGFR if Non African American: 73 mL/min/1.73M2 (07-10-21 @ 06:34)  eGFR if African American: 85 mL/min/1.73M2 (07-10-21 @ 06:34)    PT/INR - ( 2021 20:30 )   PT: 12.2 sec;   INR: 1.01 ratio         PTT - ( 2021 20:30 )  PTT:27.5 sec  Lactate, Blood: 0.9 mmol/L ( @ 20:30)                          Urinalysis Basic - ( 2021 20:30 )    Color: Yellow / Appearance: Slightly Turbid / S.020 / pH: x  Gluc: x / Ketone: Negative  / Bili: Negative / Urobili: Negative   Blood: x / Protein: Negative / Nitrite: Negative   Leuk Esterase: Negative / RBC: 0-4 /HPF / WBC 0-2 /HPF   Sq Epi: x / Non Sq Epi: Many / Bacteria: Few /HPF        COVID-19 PCR: NotDetec (21 @ 22:09)      RADIOLOGY & ADDITIONAL TESTS:    Care Discussed with Consultants/Other Providers:    Patient is a 81y old  Female who presents with a chief complaint of UTI (2021 22:46)    Patient seen and examined at bedside. Patient admits to mild abdominal pain, related to IBS but currently a bit worse. Patient also states she had episode of foul smelling loose BM this am. She usually gets diarrhea with abx due to IBS however states she has history of c diff and this episode felt like that. Noted to continue to spike fevers overnight      ALLERGIES:  Macrobid (Unknown)  sulfa drugs (Unknown)        Vital Signs Last 24 Hrs  T(F): 101.6 (10 Jul 2021 09:39), Max: 102.7 (10 Jul 2021 05:11)  HR: 98 (10 Jul 2021 05:11) (79 - 98)  BP: 127/55 (10 Jul 2021 05:11) (94/55 - 127/67)  RR: 20 (10 Jul 2021 06:20) (15 - 25)  SpO2: 98% (10 Jul 2021 06:20) (94% - 100%)  I&O's Summary    MEDICATIONS:  acetaminophen   Tablet .. 650 milliGRAM(s) Oral every 6 hours PRN  ALPRAZolam 0.5 milliGRAM(s) Oral at bedtime  enoxaparin Injectable 40 milliGRAM(s) SubCutaneous daily  lactobacillus acidophilus 1 Tablet(s) Oral daily  levoFLOXacin IVPB 500 milliGRAM(s) IV Intermittent every 24 hours  ondansetron Injectable 4 milliGRAM(s) IV Push every 8 hours PRN  pantoprazole    Tablet 40 milliGRAM(s) Oral before breakfast      PHYSICAL EXAM:  General: NAD, A/O x 3  ENT: MMM, no thrush  Neck: Supple, No JVD  Lungs: Clear to auscultation bilaterally, non labored breathing  Cardio: RRR, S1/S2, No murmurs  Abdomen: Soft, mild tenderness to palpation, Nondistended; Bowel sounds present  Extremities: No cyanosis, No edema    LABS:                        10.2   13.17 )-----------( 140      ( 10 Jul 2021 06:34 )             30.0     07-10    135  |  104  |  11  ----------------------------<  130  3.5   |  20  |  0.77    Ca    7.9      10 Jul 2021 06:34  Mg     1.7     07-10    TPro  6.2  /  Alb  3.1  /  TBili  0.6  /  DBili  x   /  AST  11  /  ALT  22  /  AlkPhos  56  07-10    eGFR if Non African American: 73 mL/min/1.73M2 (07-10-21 @ 06:34)  eGFR if African American: 85 mL/min/1.73M2 (07-10-21 @ 06:34)    PT/INR - ( 2021 20:30 )   PT: 12.2 sec;   INR: 1.01 ratio         PTT - ( 2021 20:30 )  PTT:27.5 sec  Lactate, Blood: 0.9 mmol/L ( @ 20:30)                          Urinalysis Basic - ( 2021 20:30 )    Color: Yellow / Appearance: Slightly Turbid / S.020 / pH: x  Gluc: x / Ketone: Negative  / Bili: Negative / Urobili: Negative   Blood: x / Protein: Negative / Nitrite: Negative   Leuk Esterase: Negative / RBC: 0-4 /HPF / WBC 0-2 /HPF   Sq Epi: x / Non Sq Epi: Many / Bacteria: Few /HPF        COVID-19 PCR: NotDetec (21 @ 22:09)      RADIOLOGY & ADDITIONAL TESTS:    Care Discussed with Consultants/Other Providers:

## 2021-07-11 LAB
ANION GAP SERPL CALC-SCNC: 7 MMOL/L — SIGNIFICANT CHANGE UP (ref 5–17)
BASOPHILS # BLD AUTO: 0.08 K/UL — SIGNIFICANT CHANGE UP (ref 0–0.2)
BASOPHILS NFR BLD AUTO: 0.7 % — SIGNIFICANT CHANGE UP (ref 0–2)
BUN SERPL-MCNC: 10 MG/DL — SIGNIFICANT CHANGE UP (ref 7–23)
C DIFF BY PCR RESULT: DETECTED
C DIFF TOX GENS STL QL NAA+PROBE: SIGNIFICANT CHANGE UP
CALCIUM SERPL-MCNC: 8.7 MG/DL — SIGNIFICANT CHANGE UP (ref 8.4–10.5)
CHLORIDE SERPL-SCNC: 107 MMOL/L — SIGNIFICANT CHANGE UP (ref 96–108)
CO2 SERPL-SCNC: 26 MMOL/L — SIGNIFICANT CHANGE UP (ref 22–31)
CREAT SERPL-MCNC: 0.74 MG/DL — SIGNIFICANT CHANGE UP (ref 0.5–1.3)
CULTURE RESULTS: SIGNIFICANT CHANGE UP
EOSINOPHIL # BLD AUTO: 0.06 K/UL — SIGNIFICANT CHANGE UP (ref 0–0.5)
EOSINOPHIL NFR BLD AUTO: 0.5 % — SIGNIFICANT CHANGE UP (ref 0–6)
GLUCOSE SERPL-MCNC: 102 MG/DL — HIGH (ref 70–99)
HCT VFR BLD CALC: 33.7 % — LOW (ref 34.5–45)
HGB BLD-MCNC: 11 G/DL — LOW (ref 11.5–15.5)
IMM GRANULOCYTES NFR BLD AUTO: 0.4 % — SIGNIFICANT CHANGE UP (ref 0–1.5)
LYMPHOCYTES # BLD AUTO: 0.88 K/UL — LOW (ref 1–3.3)
LYMPHOCYTES # BLD AUTO: 7.8 % — LOW (ref 13–44)
MCHC RBC-ENTMCNC: 32.6 GM/DL — SIGNIFICANT CHANGE UP (ref 32–36)
MCHC RBC-ENTMCNC: 32.8 PG — SIGNIFICANT CHANGE UP (ref 27–34)
MCV RBC AUTO: 100.6 FL — HIGH (ref 80–100)
MONOCYTES # BLD AUTO: 0.61 K/UL — SIGNIFICANT CHANGE UP (ref 0–0.9)
MONOCYTES NFR BLD AUTO: 5.4 % — SIGNIFICANT CHANGE UP (ref 2–14)
NEUTROPHILS # BLD AUTO: 9.59 K/UL — HIGH (ref 1.8–7.4)
NEUTROPHILS NFR BLD AUTO: 85.2 % — HIGH (ref 43–77)
NRBC # BLD: 0 /100 WBCS — SIGNIFICANT CHANGE UP (ref 0–0)
PLATELET # BLD AUTO: 133 K/UL — LOW (ref 150–400)
POTASSIUM SERPL-MCNC: 3.4 MMOL/L — LOW (ref 3.5–5.3)
POTASSIUM SERPL-SCNC: 3.4 MMOL/L — LOW (ref 3.5–5.3)
RBC # BLD: 3.35 M/UL — LOW (ref 3.8–5.2)
RBC # FLD: 13.5 % — SIGNIFICANT CHANGE UP (ref 10.3–14.5)
SODIUM SERPL-SCNC: 140 MMOL/L — SIGNIFICANT CHANGE UP (ref 135–145)
SPECIMEN SOURCE: SIGNIFICANT CHANGE UP
WBC # BLD: 11.27 K/UL — HIGH (ref 3.8–10.5)
WBC # FLD AUTO: 11.27 K/UL — HIGH (ref 3.8–10.5)

## 2021-07-11 PROCEDURE — 99232 SBSQ HOSP IP/OBS MODERATE 35: CPT

## 2021-07-11 RX ORDER — MAGNESIUM OXIDE 400 MG ORAL TABLET 241.3 MG
400 TABLET ORAL
Refills: 0 | Status: COMPLETED | OUTPATIENT
Start: 2021-07-11 | End: 2021-07-13

## 2021-07-11 RX ORDER — POTASSIUM CHLORIDE 20 MEQ
40 PACKET (EA) ORAL ONCE
Refills: 0 | Status: COMPLETED | OUTPATIENT
Start: 2021-07-11 | End: 2021-07-11

## 2021-07-11 RX ORDER — POTASSIUM CHLORIDE 20 MEQ
40 PACKET (EA) ORAL ONCE
Refills: 0 | Status: DISCONTINUED | OUTPATIENT
Start: 2021-07-11 | End: 2021-07-11

## 2021-07-11 RX ADMIN — Medication 125 MILLIGRAM(S): at 17:18

## 2021-07-11 RX ADMIN — Medication 125 MILLIGRAM(S): at 23:04

## 2021-07-11 RX ADMIN — Medication 1 TABLET(S): at 17:18

## 2021-07-11 RX ADMIN — ENOXAPARIN SODIUM 40 MILLIGRAM(S): 100 INJECTION SUBCUTANEOUS at 12:33

## 2021-07-11 RX ADMIN — Medication 125 MILLIGRAM(S): at 00:57

## 2021-07-11 RX ADMIN — Medication 125 MILLIGRAM(S): at 12:33

## 2021-07-11 RX ADMIN — Medication 40 MILLIEQUIVALENT(S): at 08:52

## 2021-07-11 RX ADMIN — PANTOPRAZOLE SODIUM 40 MILLIGRAM(S): 20 TABLET, DELAYED RELEASE ORAL at 05:38

## 2021-07-11 RX ADMIN — Medication 1 TABLET(S): at 09:55

## 2021-07-11 RX ADMIN — Medication 1 TABLET(S): at 12:33

## 2021-07-11 RX ADMIN — MAGNESIUM OXIDE 400 MG ORAL TABLET 400 MILLIGRAM(S): 241.3 TABLET ORAL at 17:18

## 2021-07-11 RX ADMIN — Medication 0.5 MILLIGRAM(S): at 21:49

## 2021-07-11 RX ADMIN — Medication 125 MILLIGRAM(S): at 05:38

## 2021-07-11 RX ADMIN — MAGNESIUM OXIDE 400 MG ORAL TABLET 400 MILLIGRAM(S): 241.3 TABLET ORAL at 12:33

## 2021-07-11 NOTE — PROGRESS NOTE ADULT - ASSESSMENT
81 female with no significant past medical history other than IBS admitted with a UTI now with diarrhea.    # Colitis, not specified  pt with hx of IBS, foul smelling diarrhea, persistent fever  ID following, started po vanco due to suspicion for Cdiff  CT showed colitis and non specific B/L groundglass opacities  GI pcr negative, follow up cdiff, stool culture and O&P  maintain contact precautions  monitor fever curve, leukocytosis improving    # Pan Sensitive E Coli UTI  pt with some urinary urgency (baseline), but no dysuria or hematuria for past several weeks  saw urology and routine UA/culture revealed pan sensitive E coli  initially prescribed vantin several days ago however patient with asymptomatic bacteruria   stopped IV levaquin and started po vanco for suspicion of Cdiff    # Hypokalemia  #Hypomagnesemia  replete as needed  Mg 1.7, K 3.4 this am  follow BMP    # Prophylactic Measure  lovenox, protonix   81 female with no significant past medical history other than IBS admitted with a UTI now with diarrhea.    # Colitis, not specified  pt with hx of IBS, foul smelling diarrhea, persistent fever  ID following, started po vanco due to suspicion for Cdiff, will continue   CT showed colitis and non specific B/L groundglass opacities  repeat CT chest in 1-3 months  GI pcr negative, follow up cdiff, stool culture and O&P, urine legionella  maintain contact precautions  monitor fever curve, leukocytosis improving    # Pan Sensitive E Coli UTI  pt with some urinary urgency (baseline), but no dysuria or hematuria for past several weeks  saw urology and routine UA/culture revealed pan sensitive E coli  initially prescribed vantin several days ago however patient with asymptomatic bacteruria   stopped IV levaquin and started po vanco for suspicion of Cdiff    # Hypokalemia  #Hypomagnesemia  replete as needed  K 3.4 this am  follow BMP    # Prophylactic Measure  lovenox, protonix

## 2021-07-11 NOTE — PROGRESS NOTE ADULT - ATTENDING COMMENTS
81 female with no significant past medical history other than IBS admitted with a UTI now with diarrhea.    # Colitis, not specified  - pt with hx of IBS, foul smelling diarrhea, persistent fever  - continue with oral vanco, f/u c diff PCR  - GI PCR negative, f/u stool culture and O&P, urine legionella  - Discussed case with ID    # Abnormal CT chest  - ground glass nodules on CT chest  - follow up repeat CT chest in 1-3 months outpatient

## 2021-07-11 NOTE — PROGRESS NOTE ADULT - SUBJECTIVE AND OBJECTIVE BOX
Patient is a 81y old  Female who presents with a chief complaint of UTI (10 Jul 2021 11:15)    Patient seen and examined at bedside.  no acute overnight events    ALLERGIES:  lactose (Stomach Upset; Diarrhea)  Macrobid (Unknown)  sulfa drugs (Unknown)        Vital Signs Last 24 Hrs  T(F): 99.1 (2021 05:00), Max: 101.6 (10 Jul 2021 09:39)  HR: 72 (2021 05:00) (72 - 84)  BP: 110/70 (2021 05:00) (101/71 - 121/66)  RR: 20 (2021 05:00) (18 - 20)  SpO2: 100% (2021 05:00) (96% - 100%)  I&O's Summary    10 Jul 2021 07:01  -  2021 07:00  --------------------------------------------------------  IN: 0 mL / OUT: 0 mL / NET: 0 mL      MEDICATIONS:  acetaminophen   Tablet .. 650 milliGRAM(s) Oral every 6 hours PRN  ALPRAZolam 0.5 milliGRAM(s) Oral at bedtime  enoxaparin Injectable 40 milliGRAM(s) SubCutaneous daily  lactobacillus acidophilus 1 Tablet(s) Oral three times a day with meals  magnesium oxide 400 milliGRAM(s) Oral three times a day with meals  ondansetron Injectable 4 milliGRAM(s) IV Push every 8 hours PRN  pantoprazole    Tablet 40 milliGRAM(s) Oral before breakfast  potassium chloride    Tablet ER 40 milliEquivalent(s) Oral once  vancomycin    Solution 125 milliGRAM(s) Oral every 6 hours      PHYSICAL EXAM:  General: NAD, A/O x 3  ENT: MMM, no thrush  Neck: Supple, No JVD  Lungs: Clear to auscultation bilaterally, non labored breathing  Cardio: S1S2 regular  Abdomen: Soft, Nontender  Extremities: No cyanosis, No edema    LABS:                        11.0   11.27 )-----------( 133      ( 2021 06:00 )             33.7     07-11    140  |  107  |  10  ----------------------------<  102  3.4   |  26  |  0.74    Ca    8.7      2021 06:00  Mg     1.7     07-10    TPro  6.2  /  Alb  3.1  /  TBili  0.6  /  DBili  x   /  AST  11  /  ALT  22  /  AlkPhos  56  07-10    eGFR if Non African American: 77 mL/min/1.73M2 (21 @ 06:00)  eGFR if : 89 mL/min/1.73M2 (21 @ 06:00)    PT/INR - ( 2021 20:30 )   PT: 12.2 sec;   INR: 1.01 ratio         PTT - ( 2021 20:30 )  PTT:27.5 sec  Lactate, Blood: 0.9 mmol/L ( @ 20:30)                          Urinalysis Basic - ( 2021 20:30 )    Color: Yellow / Appearance: Slightly Turbid / S.020 / pH: x  Gluc: x / Ketone: Negative  / Bili: Negative / Urobili: Negative   Blood: x / Protein: Negative / Nitrite: Negative   Leuk Esterase: Negative / RBC: 0-4 /HPF / WBC 0-2 /HPF   Sq Epi: x / Non Sq Epi: Many / Bacteria: Few /HPF        GI PCR Panel, Stool (collected 10 Jul 2021 12:06)  Source: .Stool Feces  Final Report (10 Jul 2021 23:11):    GI PCR Results: NOT detected    *******Please Note:*******    GI panel PCR evaluates for:    Campylobacter, Plesiomonas shigelloides, Salmonella,    Vibrio, Yersinia enterocolitica, Enteroaggregative    Escherichia coli (EAEC), Enteropathogenic E.coli (EPEC),    Enterotoxigenic E. coli (ETEC) lt/st, Shiga-like    toxin-producing E. coli (STEC) stx1/stx2,    Shigella/ Enteroinvasive E. coli (EIEC), Cryptosporidium,    Cyclospora cayetanensis, Entamoeba histolytica,    Giardia lamblia, Adenovirus F 40/41, Astrovirus,    Norovirus GI/GII, Rotavirus A, Sapovirus    Culture - Urine (collected 2021 20:30)  Source: .Urine Clean Catch (Midstream)  Final Report (2021 03:40):    <10,000 CFU/mL Normal Urogenital Nohemi    Culture - Blood (collected 2021 20:30)  Source: .Blood Blood-Peripheral  Preliminary Report (2021 02:00):    No growth to date.    Culture - Blood (collected 2021 20:30)  Source: .Blood Blood-Peripheral  Preliminary Report (2021 02:00):    No growth to date.      COVID-19 PCR: NotDetec (21 @ 22:09)      RADIOLOGY & ADDITIONAL TESTS:    < from: CT Chest w/ Oral Cont (07.10.21 @ 19:58) >  IMPRESSION:    1. Colitis.  2. Scattered small ground glass nodules throughout the lungs are nonspecific, most likelyinfectious or inflammatory small airways disease. Follow-up CT imaging in 1-3 months is recommended.      --- End of Report ---              NATALI JAMES MD; Attending Radiologist  This document has been electronically signed. Jul 10 2021  9:14PM    < end of copied text >      Care Discussed with Consultants/Other Providers:    Patient is a 81y old  Female who presents with a chief complaint of UTI (10 Jul 2021 11:15)    Patient seen and examined at bedside.  patient continued to spike fevers overnight     ALLERGIES:  lactose (Stomach Upset; Diarrhea)  Macrobid (Unknown)  sulfa drugs (Unknown)        Vital Signs Last 24 Hrs  T(F): 99.1 (2021 05:00), Max: 101.6 (10 Jul 2021 09:39)  HR: 72 (2021 05:00) (72 - 84)  BP: 110/70 (2021 05:00) (101/71 - 121/66)  RR: 20 (2021 05:00) (18 - 20)  SpO2: 100% (2021 05:00) (96% - 100%)  I&O's Summary    10 Jul 2021 07:01  -  2021 07:00  --------------------------------------------------------  IN: 0 mL / OUT: 0 mL / NET: 0 mL      MEDICATIONS:  acetaminophen   Tablet .. 650 milliGRAM(s) Oral every 6 hours PRN  ALPRAZolam 0.5 milliGRAM(s) Oral at bedtime  enoxaparin Injectable 40 milliGRAM(s) SubCutaneous daily  lactobacillus acidophilus 1 Tablet(s) Oral three times a day with meals  magnesium oxide 400 milliGRAM(s) Oral three times a day with meals  ondansetron Injectable 4 milliGRAM(s) IV Push every 8 hours PRN  pantoprazole    Tablet 40 milliGRAM(s) Oral before breakfast  potassium chloride    Tablet ER 40 milliEquivalent(s) Oral once  vancomycin    Solution 125 milliGRAM(s) Oral every 6 hours      PHYSICAL EXAM:  General: NAD, A/O x 3  ENT: MMM, no thrush  Neck: Supple, No JVD  Lungs: Clear to auscultation bilaterally, non labored breathing  Cardio: S1S2 regular  Abdomen: Soft, Nontender  Extremities: No cyanosis, No edema    LABS:                        11.0   11.27 )-----------( 133      ( 2021 06:00 )             33.7     07-11    140  |  107  |  10  ----------------------------<  102  3.4   |  26  |  0.74    Ca    8.7      2021 06:00  Mg     1.7     07-10    TPro  6.2  /  Alb  3.1  /  TBili  0.6  /  DBili  x   /  AST  11  /  ALT  22  /  AlkPhos  56  07-10    eGFR if Non African American: 77 mL/min/1.73M2 (21 @ 06:00)  eGFR if : 89 mL/min/1.73M2 (21 @ 06:00)    PT/INR - ( 2021 20:30 )   PT: 12.2 sec;   INR: 1.01 ratio         PTT - ( 2021 20:30 )  PTT:27.5 sec  Lactate, Blood: 0.9 mmol/L ( @ 20:30)                          Urinalysis Basic - ( 2021 20:30 )    Color: Yellow / Appearance: Slightly Turbid / S.020 / pH: x  Gluc: x / Ketone: Negative  / Bili: Negative / Urobili: Negative   Blood: x / Protein: Negative / Nitrite: Negative   Leuk Esterase: Negative / RBC: 0-4 /HPF / WBC 0-2 /HPF   Sq Epi: x / Non Sq Epi: Many / Bacteria: Few /HPF        GI PCR Panel, Stool (collected 10 Jul 2021 12:06)  Source: .Stool Feces  Final Report (10 Jul 2021 23:11):    GI PCR Results: NOT detected    *******Please Note:*******    GI panel PCR evaluates for:    Campylobacter, Plesiomonas shigelloides, Salmonella,    Vibrio, Yersinia enterocolitica, Enteroaggregative    Escherichia coli (EAEC), Enteropathogenic E.coli (EPEC),    Enterotoxigenic E. coli (ETEC) lt/st, Shiga-like    toxin-producing E. coli (STEC) stx1/stx2,    Shigella/ Enteroinvasive E. coli (EIEC), Cryptosporidium,    Cyclospora cayetanensis, Entamoeba histolytica,    Giardia lamblia, Adenovirus F 40/41, Astrovirus,    Norovirus GI/GII, Rotavirus A, Sapovirus    Culture - Urine (collected 2021 20:30)  Source: .Urine Clean Catch (Midstream)  Final Report (2021 03:40):    <10,000 CFU/mL Normal Urogenital Nohemi    Culture - Blood (collected 2021 20:30)  Source: .Blood Blood-Peripheral  Preliminary Report (2021 02:00):    No growth to date.    Culture - Blood (collected 2021 20:30)  Source: .Blood Blood-Peripheral  Preliminary Report (2021 02:00):    No growth to date.      COVID-19 PCR: NotDetec (21 @ 22:09)      RADIOLOGY & ADDITIONAL TESTS:    < from: CT Chest w/ Oral Cont (07.10.21 @ 19:58) >  IMPRESSION:    1. Colitis.  2. Scattered small ground glass nodules throughout the lungs are nonspecific, most likelyinfectious or inflammatory small airways disease. Follow-up CT imaging in 1-3 months is recommended.      --- End of Report ---              NATALI JAMES MD; Attending Radiologist  This document has been electronically signed. Jul 10 2021  9:14PM    < end of copied text >      Care Discussed with Consultants/Other Providers:

## 2021-07-11 NOTE — PROGRESS NOTE ADULT - SUBJECTIVE AND OBJECTIVE BOX
CC: f/u for colitis    Patient reports feeling much better today , abdomen a bit sore    REVIEW OF SYSTEMS:  All other review of systems negative (Comprehensive ROS)    Antimicrobials Day #  :2  vancomycin    Solution 125 milliGRAM(s) Oral every 6 hours    Other Medications Reviewed    T(F): 99.1 (21 @ 05:00), Max: 101 (07-10-21 @ 21:15)  HR: 72 (21 @ 05:00)  BP: 110/70 (21 @ 05:00)  RR: 20 (21 @ 05:00)  SpO2: 100% (21 @ 05:00)  Wt(kg): --    PHYSICAL EXAM:  General: alert, no acute distress  Eyes:  anicteric, no conjunctival injection, no discharge  Oropharynx: no lesions or injection 	  Neck: supple, without adenopathy  Lungs: clear to auscultation  Heart: regular rate and rhythm; no murmur, rubs or gallops  Abdomen: soft, nondistended, nontender, without mass or organomegaly  Skin: no lesions  Extremities: no clubbing, cyanosis, or edema  Neurologic: alert, oriented, moves all extremities    LAB RESULTS:                        11.0   11.27 )-----------( 133      ( 2021 06:00 )             33.7     07-11    140  |  107  |  10  ----------------------------<  102<H>  3.4<L>   |  26  |  0.74    Ca    8.7      2021 06:00  Mg     1.7     07-10    TPro  6.2  /  Alb  3.1<L>  /  TBili  0.6  /  DBili  x   /  AST  11  /  ALT  22  /  AlkPhos  56  07-10    LIVER FUNCTIONS - ( 10 Jul 2021 06:34 )  Alb: 3.1 g/dL / Pro: 6.2 g/dL / ALK PHOS: 56 U/L / ALT: 22 U/L / AST: 11 U/L / GGT: x           Urinalysis Basic - ( 2021 20:30 )    Color: Yellow / Appearance: Slightly Turbid / S.020 / pH: x  Gluc: x / Ketone: Negative  / Bili: Negative / Urobili: Negative   Blood: x / Protein: Negative / Nitrite: Negative   Leuk Esterase: Negative / RBC: 0-4 /HPF / WBC 0-2 /HPF   Sq Epi: x / Non Sq Epi: Many / Bacteria: Few /HPF      MICROBIOLOGY:  RECENT CULTURES:  07-10 @ 12:06 .Stool Feces     GI PCR Results: NOT detected  *******Please Note:*******  GI panel PCR evaluates for:  Campylobacter, Plesiomonas shigelloides, Salmonella,  Vibrio, Yersinia enterocolitica, Enteroaggregative  Escherichia coli (EAEC), Enteropathogenic E.coli (EPEC),  Enterotoxigenic E. coli (ETEC) lt/st, Shiga-like  toxin-producing E. coli (STEC) stx1/stx2,  Shigella/ Enteroinvasive E. coli (EIEC), Cryptosporidium,  Cyclospora cayetanensis, Entamoeba histolytica,  Giardia lamblia, Adenovirus F 40/41, Astrovirus,  Norovirus GI/GII, Rotavirus A, Sapovirus       @ 20:30 .Blood Blood-Peripheral     No growth to date.          RADIOLOGY REVIEWED:  < from: CT Chest w/ Oral Cont (07.10.21 @ 19:58) >  EXAM:  CT ABDOMEN AND PELVIS OC    EXAM:  CT CHEST OC      PROCEDURE DATE:  07/10/2021        INTERPRETATION:  CLINICAL INFORMATION: Fever, evaluate for occult malignancy or infection.    COMPARISON: None.    CONTRAST/COMPLICATIONS:  IV Contrast: None  Oral Contrast: Administered.  Complications: None    PROCEDURE:  CT of the Chest, Abdomen and Pelvis was performed.  Sagittal and coronal reformats were performed.    FINDINGS:  Evaluation of solid organs and vascular structures is limited without intravenous contrast.    CHEST:  LUNGS AND LARGE AIRWAYS: Patent central airways. Bibasilar subsegmental atelectasis. Numerous scattered bilateral small ground glass nodules measuring up to 3 to 4 mm within all lobes.  PLEURA: No pleural effusion.  VESSELS: Atherosclerotic changes of the aorta.  HEART: Heart size is normal. Trace pericardial fluid.  MEDIASTINUM AND CHELY: No lymphadenopathy.  CHEST WALL AND LOWER NECK: Nonspecific small bilateral breast calcifications.    ABDOMEN AND PELVIS:  LIVER: Within normal limits.  BILE DUCTS: Normal caliber.  GALLBLADDER: Within normal limits.  SPLEEN: Within normal limits.  PANCREAS: Within normal limits.  ADRENALS: Bilateral adrenal gland thickening.  KIDNEYS/URETERS: No hydronephrosis or urinary tract stone. Left renal cysts.    BLADDER: Within normal limits.  REPRODUCTIVE ORGANS: Uterus and adnexa within normal limits.    BOWEL: No bowel obstruction. Appendix is normal. Multifocal colonic wall thickening most prominently involving the rectosigmoid colon. Associated pericolonic inflammation involving the descending colon and rectum.  PERITONEUM: No ascites.  VESSELS: Atherosclerotic changes.  RETROPERITONEUM/LYMPH NODES: No lymphadenopathy.  ABDOMINAL WALL: Small fat-containing of local hernia.  BONES: Degenerative changes. 4 mm sclerotic focus in the left sacrum, nonspecific, possibly bone island. S shaped scoliosis.    IMPRESSION:    1. Colitis.  2. Scattered small ground glass nodules throughout the lungs are nonspecific, most likelyinfectious or inflammatory small airways disease. Follow-up CT imaging in 1-3 months is recommended.      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 had a few days of cefpodoxime for assymptomatic bactiuria then developed fever, chills , worse diarrhea than baseline, suspect cdif given prior history with similar presentation. Urine cx is neg and never symptomatic so partially treated pyelo not apparent. CT with colitis which supports cdif too, neg gi pcr. She has some tiny ggo which I can barely see and no pulmonary symptoms so maybe just some fluid but will check legionella to be complete, rvp is negative. She is clinically improving on po vanco  Plan:  continue po vanco  await stool cdif  await final blood and stool cx, stool o and p, urine legionella  will need f/u with pcp to do repeat chest ct in a couple of months  monitor stool output and exam

## 2021-07-12 LAB
ANION GAP SERPL CALC-SCNC: 6 MMOL/L — SIGNIFICANT CHANGE UP (ref 5–17)
BASOPHILS # BLD AUTO: 0.06 K/UL — SIGNIFICANT CHANGE UP (ref 0–0.2)
BASOPHILS NFR BLD AUTO: 1 % — SIGNIFICANT CHANGE UP (ref 0–2)
BUN SERPL-MCNC: 9 MG/DL — SIGNIFICANT CHANGE UP (ref 7–23)
CALCIUM SERPL-MCNC: 8.5 MG/DL — SIGNIFICANT CHANGE UP (ref 8.4–10.5)
CHLORIDE SERPL-SCNC: 107 MMOL/L — SIGNIFICANT CHANGE UP (ref 96–108)
CO2 SERPL-SCNC: 26 MMOL/L — SIGNIFICANT CHANGE UP (ref 22–31)
CREAT SERPL-MCNC: 0.67 MG/DL — SIGNIFICANT CHANGE UP (ref 0.5–1.3)
CULTURE RESULTS: SIGNIFICANT CHANGE UP
EOSINOPHIL # BLD AUTO: 0.06 K/UL — SIGNIFICANT CHANGE UP (ref 0–0.5)
EOSINOPHIL NFR BLD AUTO: 1 % — SIGNIFICANT CHANGE UP (ref 0–6)
GLUCOSE SERPL-MCNC: 153 MG/DL — HIGH (ref 70–99)
HCT VFR BLD CALC: 31 % — LOW (ref 34.5–45)
HGB BLD-MCNC: 10.3 G/DL — LOW (ref 11.5–15.5)
HYPOCHROMIA BLD QL: SLIGHT — SIGNIFICANT CHANGE UP
LYMPHOCYTES # BLD AUTO: 0.85 K/UL — LOW (ref 1–3.3)
LYMPHOCYTES # BLD AUTO: 14 % — SIGNIFICANT CHANGE UP (ref 13–44)
MANUAL SMEAR VERIFICATION: SIGNIFICANT CHANGE UP
MCHC RBC-ENTMCNC: 32.8 PG — SIGNIFICANT CHANGE UP (ref 27–34)
MCHC RBC-ENTMCNC: 33.2 GM/DL — SIGNIFICANT CHANGE UP (ref 32–36)
MCV RBC AUTO: 98.7 FL — SIGNIFICANT CHANGE UP (ref 80–100)
MONOCYTES # BLD AUTO: 0.61 K/UL — SIGNIFICANT CHANGE UP (ref 0–0.9)
MONOCYTES NFR BLD AUTO: 10 % — SIGNIFICANT CHANGE UP (ref 2–14)
NEUTROPHILS # BLD AUTO: 4.49 K/UL — SIGNIFICANT CHANGE UP (ref 1.8–7.4)
NEUTROPHILS NFR BLD AUTO: 72 % — SIGNIFICANT CHANGE UP (ref 43–77)
NEUTS BAND # BLD: 2 % — SIGNIFICANT CHANGE UP (ref 0–8)
NRBC # BLD: 0 /100 — SIGNIFICANT CHANGE UP (ref 0–0)
PLAT MORPH BLD: NORMAL — SIGNIFICANT CHANGE UP
PLATELET # BLD AUTO: 134 K/UL — LOW (ref 150–400)
POTASSIUM SERPL-MCNC: 3.4 MMOL/L — LOW (ref 3.5–5.3)
POTASSIUM SERPL-SCNC: 3.4 MMOL/L — LOW (ref 3.5–5.3)
RBC # BLD: 3.14 M/UL — LOW (ref 3.8–5.2)
RBC # FLD: 13.3 % — SIGNIFICANT CHANGE UP (ref 10.3–14.5)
RBC BLD AUTO: ABNORMAL
SODIUM SERPL-SCNC: 139 MMOL/L — SIGNIFICANT CHANGE UP (ref 135–145)
SPECIMEN SOURCE: SIGNIFICANT CHANGE UP
WBC # BLD: 6.07 K/UL — SIGNIFICANT CHANGE UP (ref 3.8–10.5)
WBC # FLD AUTO: 6.07 K/UL — SIGNIFICANT CHANGE UP (ref 3.8–10.5)

## 2021-07-12 PROCEDURE — 99233 SBSQ HOSP IP/OBS HIGH 50: CPT

## 2021-07-12 RX ORDER — POTASSIUM CHLORIDE 20 MEQ
40 PACKET (EA) ORAL ONCE
Refills: 0 | Status: COMPLETED | OUTPATIENT
Start: 2021-07-12 | End: 2021-07-12

## 2021-07-12 RX ADMIN — Medication 1 TABLET(S): at 17:15

## 2021-07-12 RX ADMIN — Medication 125 MILLIGRAM(S): at 23:08

## 2021-07-12 RX ADMIN — Medication 1 TABLET(S): at 12:20

## 2021-07-12 RX ADMIN — ENOXAPARIN SODIUM 40 MILLIGRAM(S): 100 INJECTION SUBCUTANEOUS at 12:19

## 2021-07-12 RX ADMIN — Medication 1 TABLET(S): at 08:34

## 2021-07-12 RX ADMIN — Medication 125 MILLIGRAM(S): at 12:19

## 2021-07-12 RX ADMIN — Medication 0.5 MILLIGRAM(S): at 23:08

## 2021-07-12 RX ADMIN — MAGNESIUM OXIDE 400 MG ORAL TABLET 400 MILLIGRAM(S): 241.3 TABLET ORAL at 08:34

## 2021-07-12 RX ADMIN — PANTOPRAZOLE SODIUM 40 MILLIGRAM(S): 20 TABLET, DELAYED RELEASE ORAL at 06:18

## 2021-07-12 RX ADMIN — MAGNESIUM OXIDE 400 MG ORAL TABLET 400 MILLIGRAM(S): 241.3 TABLET ORAL at 17:15

## 2021-07-12 RX ADMIN — Medication 40 MILLIEQUIVALENT(S): at 12:19

## 2021-07-12 RX ADMIN — Medication 125 MILLIGRAM(S): at 17:15

## 2021-07-12 RX ADMIN — Medication 125 MILLIGRAM(S): at 06:18

## 2021-07-12 RX ADMIN — MAGNESIUM OXIDE 400 MG ORAL TABLET 400 MILLIGRAM(S): 241.3 TABLET ORAL at 12:19

## 2021-07-12 NOTE — PROGRESS NOTE ADULT - SUBJECTIVE AND OBJECTIVE BOX
CC: f/u for    Patient reports    REVIEW OF SYSTEMS: no fevers, no chills, no nausea, no vomiting, no abd pain, no resp symptoms  All other review of systems negative (Comprehensive ROS)    Antimicrobials Day #    vancomycin    Solution 125 milliGRAM(s) Oral every 6 hours    Other Medications Reviewed    T(F): 98.3 (07-12-21 @ 05:20), Max: 100.2 (07-11-21 @ 15:47)  HR: 68 (07-12-21 @ 05:20)  BP: 120/55 (07-12-21 @ 05:30)  RR: 18 (07-12-21 @ 05:20)  SpO2: 100% (07-12-21 @ 05:20)    PHYSICAL EXAM:  General: alert, no acute distress  Eyes:  anicteric, no conjunctival injection, no discharge  Oropharynx: no lesions or injection 	  Neck: supple, without adenopathy  Lungs: clear to auscultation  Heart: regular rate and rhythm; no murmur, rubs or gallops  Abdomen: soft, nondistended, nontender, without mass or organomegaly  Skin: no lesions  Extremities: no clubbing, cyanosis, or edema  Neurologic: alert, oriented, moves all extremities    LAB RESULTS:                        10.3   6.07  )-----------( 134      ( 12 Jul 2021 06:30 )             31.0     07-12    139  |  107  |  9   ----------------------------<  153<H>  3.4<L>   |  26  |  0.67    Ca    8.5      12 Jul 2021 06:30            MICROBIOLOGY REVIEWED:  Ova and Parasites (07.10.21 @ 12:06)   Culture Results:   Testing in progress Culture - Stool (07.10.21 @ 12:06)   Specimen Source: .Stool Feces   Culture Results:   No enteric pathogens to date: Final culture pending   No enteric gram negative rods isolated Culture Results:   GI PCR Results: NOT detected   C Diff by PCR Result: Detected (07.10.21 @ 18:00)   RADIOLOGY REVIEWED:     CC: f/u for C diff diarrhea, colitis    Patient reports feeling better, tolerates diet, diarrhea less    REVIEW OF SYSTEMS: no fevers, no chills, no nausea, no vomiting, no abd pain, no resp symptoms  All other review of systems negative (Comprehensive ROS)    Antimicrobials Day # 3   vancomycin    Solution 125 milliGRAM(s) Oral every 6 hours    Other Medications Reviewed    T(F): 98.3 (07-12-21 @ 05:20), Max: 100.2 (07-11-21 @ 15:47)  HR: 68 (07-12-21 @ 05:20)  BP: 120/55 (07-12-21 @ 05:30)  RR: 18 (07-12-21 @ 05:20)  SpO2: 100% (07-12-21 @ 05:20)    PHYSICAL EXAM:  General: alert, no acute distress  Eyes:  anicteric, no conjunctival injection, no discharge  Oropharynx: no lesions or injection 	  Neck: supple, without adenopathy  Lungs: clear to auscultation  Heart: regular rate and rhythm; no murmur, rubs or gallops  Abdomen: soft, nondistended, nontender, without mass or organomegaly  Skin: no lesions  Extremities: no clubbing, cyanosis, or edema  Neurologic: alert, oriented, moves all extremities    LAB RESULTS:                        10.3   6.07  )-----------( 134      ( 12 Jul 2021 06:30 )             31.0     07-12    139  |  107  |  9   ----------------------------<  153<H>  3.4<L>   |  26  |  0.67    Ca    8.5      12 Jul 2021 06:30            MICROBIOLOGY REVIEWED:  Ova and Parasites (07.10.21 @ 12:06)   Culture Results:   Testing in progress Culture - Stool (07.10.21 @ 12:06)   Specimen Source: .Stool Feces   Culture Results:   No enteric pathogens to date: Final culture pending   No enteric gram negative rods isolated Culture Results:   GI PCR Results: NOT detected   C Diff by PCR Result: Detected (07.10.21 @ 18:00)   RADIOLOGY REVIEWED:

## 2021-07-12 NOTE — PROGRESS NOTE ADULT - SUBJECTIVE AND OBJECTIVE BOX
Patient is a 81y old  Female who presents with a chief complaint of UTI (2021 11:15)    Patient seen and examined at bedside.  no acute overnight events    ALLERGIES:  lactose (Stomach Upset; Diarrhea)  Macrobid (Unknown)  sulfa drugs (Unknown)        Vital Signs Last 24 Hrs  T(F): 98.3 (2021 05:20), Max: 100.2 (2021 15:47)  HR: 68 (2021 05:20) (66 - 73)  BP: 120/55 (2021 05:30) (98/46 - 128/66)  RR: 18 (2021 05:20) (16 - 18)  SpO2: 100% (2021 05:20) (96% - 100%)  I&O's Summary    2021 07:01  -  2021 07:00  --------------------------------------------------------  IN: 640 mL / OUT: 0 mL / NET: 640 mL      MEDICATIONS:  acetaminophen   Tablet .. 650 milliGRAM(s) Oral every 6 hours PRN  ALPRAZolam 0.5 milliGRAM(s) Oral at bedtime  enoxaparin Injectable 40 milliGRAM(s) SubCutaneous daily  lactobacillus acidophilus 1 Tablet(s) Oral three times a day with meals  magnesium oxide 400 milliGRAM(s) Oral three times a day with meals  ondansetron Injectable 4 milliGRAM(s) IV Push every 8 hours PRN  pantoprazole    Tablet 40 milliGRAM(s) Oral before breakfast  vancomycin    Solution 125 milliGRAM(s) Oral every 6 hours      PHYSICAL EXAM:  General: NAD, A/O x 3  ENT: MMM, no thrush  Neck: Supple, No JVD  Lungs: Clear to auscultation bilaterally, non labored breathing  Cardio: S1S2 regular  Abdomen: Soft, Nontender  Extremities: No cyanosis, No edema    LABS:                        10.3   6.07  )-----------( 134      ( 2021 06:30 )             31.0     07-11    140  |  107  |  10  ----------------------------<  102  3.4   |  26  |  0.74    Ca    8.7      2021 06:00  Mg     1.7     07-10    TPro  6.2  /  Alb  3.1  /  TBili  0.6  /  DBili  x   /  AST  11  /  ALT  22  /  AlkPhos  56  07-10    eGFR if Non African American: 77 mL/min/1.73M2 (21 @ 06:00)  eGFR if : 89 mL/min/1.73M2 (21 @ 06:00)    PT/INR - ( 2021 20:30 )   PT: 12.2 sec;   INR: 1.01 ratio         PTT - ( 2021 20:30 )  PTT:27.5 sec  Lactate, Blood: 0.9 mmol/L ( @ 20:30)                          Urinalysis Basic - ( 2021 20:30 )    Color: Yellow / Appearance: Slightly Turbid / S.020 / pH: x  Gluc: x / Ketone: Negative  / Bili: Negative / Urobili: Negative   Blood: x / Protein: Negative / Nitrite: Negative   Leuk Esterase: Negative / RBC: 0-4 /HPF / WBC 0-2 /HPF   Sq Epi: x / Non Sq Epi: Many / Bacteria: Few /HPF        Culture - Stool (collected 10 Jul 2021 12:06)  Source: .Stool Feces  Preliminary Report (2021 19:34):    No enteric pathogens to date: Final culture pending    No enteric gram negative rods isolated    GI PCR Panel, Stool (collected 10 Jul 2021 12:06)  Source: .Stool Feces  Final Report (10 Jul 2021 23:11):    GI PCR Results: NOT detected    *******Please Note:*******    GI panel PCR evaluates for:    Campylobacter, Plesiomonas shigelloides, Salmonella,    Vibrio, Yersinia enterocolitica, Enteroaggregative    Escherichia coli (EAEC), Enteropathogenic E.coli (EPEC),    Enterotoxigenic E. coli (ETEC) lt/st, Shiga-like    toxin-producing E. coli (STEC) stx1/stx2,    Shigella/ Enteroinvasive E. coli (EIEC), Cryptosporidium,    Cyclospora cayetanensis, Entamoeba histolytica,    Giardia lamblia, Adenovirus F 40/41, Astrovirus,    Norovirus GI/GII, Rotavirus A, Sapovirus    Culture - Urine (collected 2021 20:30)  Source: .Urine Clean Catch (Midstream)  Final Report (2021 03:40):    <10,000 CFU/mL Normal Urogenital Nohemi    Culture - Blood (collected 2021 20:30)  Source: .Blood Blood-Peripheral  Preliminary Report (2021 02:00):    No growth to date.    Culture - Blood (collected 2021 20:30)  Source: .Blood Blood-Peripheral  Preliminary Report (2021 02:00):    No growth to date.      COVID-19 PCR: NotDetec (21 @ 22:09)      RADIOLOGY & ADDITIONAL TESTS:    Care Discussed with Consultants/Other Providers:    Patient is a 81y old  Female who presents with a chief complaint of UTI (2021 11:15)    Patient seen and examined at bedside.  no acute overnight events, patient continues to have diarrhea but states fevers have gotten better    ALLERGIES:  lactose (Stomach Upset; Diarrhea)  Macrobid (Unknown)  sulfa drugs (Unknown)        Vital Signs Last 24 Hrs  T(F): 98.3 (2021 05:20), Max: 100.2 (2021 15:47)  HR: 68 (2021 05:20) (66 - 73)  BP: 120/55 (2021 05:30) (98/46 - 128/66)  RR: 18 (2021 05:20) (16 - 18)  SpO2: 100% (2021 05:20) (96% - 100%)  I&O's Summary    2021 07:01  -  2021 07:00  --------------------------------------------------------  IN: 640 mL / OUT: 0 mL / NET: 640 mL      MEDICATIONS:  acetaminophen   Tablet .. 650 milliGRAM(s) Oral every 6 hours PRN  ALPRAZolam 0.5 milliGRAM(s) Oral at bedtime  enoxaparin Injectable 40 milliGRAM(s) SubCutaneous daily  lactobacillus acidophilus 1 Tablet(s) Oral three times a day with meals  magnesium oxide 400 milliGRAM(s) Oral three times a day with meals  ondansetron Injectable 4 milliGRAM(s) IV Push every 8 hours PRN  pantoprazole    Tablet 40 milliGRAM(s) Oral before breakfast  vancomycin    Solution 125 milliGRAM(s) Oral every 6 hours      PHYSICAL EXAM:  General: NAD, A/O x 3  ENT: MMM, no thrush  Neck: Supple, No JVD  Lungs: Clear to auscultation bilaterally, non labored breathing  Cardio: S1S2 regular  Abdomen: Soft, tenderness to palpation of lower quadrants, improving  Extremities: No cyanosis, No edema    LABS:                        10.3   6.07  )-----------( 134      ( 2021 06:30 )             31.0     07-11    140  |  107  |  10  ----------------------------<  102  3.4   |  26  |  0.74    Ca    8.7      2021 06:00  Mg     1.7     07-10    TPro  6.2  /  Alb  3.1  /  TBili  0.6  /  DBili  x   /  AST  11  /  ALT  22  /  AlkPhos  56  07-10    eGFR if Non African American: 77 mL/min/1.73M2 (21 @ 06:00)  eGFR if : 89 mL/min/1.73M2 (21 @ 06:00)    PT/INR - ( 2021 20:30 )   PT: 12.2 sec;   INR: 1.01 ratio         PTT - ( 2021 20:30 )  PTT:27.5 sec  Lactate, Blood: 0.9 mmol/L ( @ 20:30)                          Urinalysis Basic - ( 2021 20:30 )    Color: Yellow / Appearance: Slightly Turbid / S.020 / pH: x  Gluc: x / Ketone: Negative  / Bili: Negative / Urobili: Negative   Blood: x / Protein: Negative / Nitrite: Negative   Leuk Esterase: Negative / RBC: 0-4 /HPF / WBC 0-2 /HPF   Sq Epi: x / Non Sq Epi: Many / Bacteria: Few /HPF        Culture - Stool (collected 10 Jul 2021 12:06)  Source: .Stool Feces  Preliminary Report (2021 19:34):    No enteric pathogens to date: Final culture pending    No enteric gram negative rods isolated    GI PCR Panel, Stool (collected 10 Jul 2021 12:06)  Source: .Stool Feces  Final Report (10 Jul 2021 23:11):    GI PCR Results: NOT detected    *******Please Note:*******    GI panel PCR evaluates for:    Campylobacter, Plesiomonas shigelloides, Salmonella,    Vibrio, Yersinia enterocolitica, Enteroaggregative    Escherichia coli (EAEC), Enteropathogenic E.coli (EPEC),    Enterotoxigenic E. coli (ETEC) lt/st, Shiga-like    toxin-producing E. coli (STEC) stx1/stx2,    Shigella/ Enteroinvasive E. coli (EIEC), Cryptosporidium,    Cyclospora cayetanensis, Entamoeba histolytica,    Giardia lamblia, Adenovirus F 40/41, Astrovirus,    Norovirus GI/GII, Rotavirus A, Sapovirus    Culture - Urine (collected 2021 20:30)  Source: .Urine Clean Catch (Midstream)  Final Report (2021 03:40):    <10,000 CFU/mL Normal Urogenital Nohemi    Culture - Blood (collected 2021 20:30)  Source: .Blood Blood-Peripheral  Preliminary Report (2021 02:00):    No growth to date.    Culture - Blood (collected 2021 20:30)  Source: .Blood Blood-Peripheral  Preliminary Report (2021 02:00):    No growth to date.      COVID-19 PCR: Darian (21 @ 22:09)      RADIOLOGY & ADDITIONAL TESTS:    Care Discussed with Consultants/Other Providers:

## 2021-07-12 NOTE — PROGRESS NOTE ADULT - ATTENDING COMMENTS
81 female with no significant past medical history other than IBS admitted with a UTI now with c diff infection    # Colitis 2/2 c diff  - pt with hx of IBS, foul smelling diarrhea, persistent fever  - c diff PCR positive  - continue with oral vanco  - GI PCR negative, f/u stool culture and O&P, urine legionella  - Discussed case with ID    # Abnormal CT chest  - ground glass nodules on CT chest  - follow up repeat CT chest in 1-3 months outpatient

## 2021-07-12 NOTE — PROGRESS NOTE ADULT - ASSESSMENT
82 yo woman with ibs, caruncle of urethra. had a few days of cefpodoxime for assymptomatic bacteriuria then developed fever, chills , worse diarrhea than baseline, suspect cdif given prior history with similar presentation.   Urine cx is neg and never symptomatic so partially treated pyelo not apparent.   CT with colitis which supports cdif too, neg gi pcr, no pulmonary symptoms, rvp is negative.   She is clinically improving on po vanco, C diff PCR+  Plan:  continue po vanco  await final blood and stool cx, stool o and p, urine legionella  will need f/u with pcp to do repeat chest ct in a couple of months

## 2021-07-12 NOTE — PROGRESS NOTE ADULT - ASSESSMENT
81 female with no significant past medical history other than IBS admitted with a UTI now with diarrhea.    # Clostridium Difficile Colitis  pt with hx of IBS, foul smelling diarrhea, persistent fever  CT showed colitis and non specific B/L groundglass opacities  repeat CT chest in 1-3 months  GI pcr negative, CDiff PCR positive, RVP negative, follow up legionella  ID following - continue PO Vanco day 3  maintain contact precautions  monitor fever curve, leukocytosis improving    # Pan Sensitive E Coli UTI  pt with some urinary urgency (baseline), but no dysuria or hematuria for past several weeks  saw urology and routine UA/culture revealed pan sensitive E coli  initially prescribed vantin several days ago however patient with asymptomatic bacteruria   stopped IV levaquin and started po vanco for suspicion of Cdiff    # Hypokalemia  #Hypomagnesemia  replete as needed  follow BMP    # Prophylactic Measure  lovenox, protonix    # Dispo  dc home when cleared by ID on po vanco to finish course

## 2021-07-13 ENCOUNTER — TRANSCRIPTION ENCOUNTER (OUTPATIENT)
Age: 81
End: 2021-07-13

## 2021-07-13 VITALS
DIASTOLIC BLOOD PRESSURE: 73 MMHG | TEMPERATURE: 97 F | SYSTOLIC BLOOD PRESSURE: 113 MMHG | HEART RATE: 68 BPM | RESPIRATION RATE: 18 BRPM

## 2021-07-13 LAB
ANION GAP SERPL CALC-SCNC: 7 MMOL/L — SIGNIFICANT CHANGE UP (ref 5–17)
BASOPHILS # BLD AUTO: 0.03 K/UL — SIGNIFICANT CHANGE UP (ref 0–0.2)
BASOPHILS NFR BLD AUTO: 0.6 % — SIGNIFICANT CHANGE UP (ref 0–2)
BUN SERPL-MCNC: 12 MG/DL — SIGNIFICANT CHANGE UP (ref 7–23)
CALCIUM SERPL-MCNC: 8.9 MG/DL — SIGNIFICANT CHANGE UP (ref 8.4–10.5)
CHLORIDE SERPL-SCNC: 106 MMOL/L — SIGNIFICANT CHANGE UP (ref 96–108)
CO2 SERPL-SCNC: 26 MMOL/L — SIGNIFICANT CHANGE UP (ref 22–31)
CREAT SERPL-MCNC: 0.71 MG/DL — SIGNIFICANT CHANGE UP (ref 0.5–1.3)
EOSINOPHIL # BLD AUTO: 0.21 K/UL — SIGNIFICANT CHANGE UP (ref 0–0.5)
EOSINOPHIL NFR BLD AUTO: 3.9 % — SIGNIFICANT CHANGE UP (ref 0–6)
GLUCOSE SERPL-MCNC: 103 MG/DL — HIGH (ref 70–99)
HCT VFR BLD CALC: 31.3 % — LOW (ref 34.5–45)
HGB BLD-MCNC: 10.4 G/DL — LOW (ref 11.5–15.5)
IMM GRANULOCYTES NFR BLD AUTO: 0.6 % — SIGNIFICANT CHANGE UP (ref 0–1.5)
LEGIONELLA AG UR QL: NEGATIVE — SIGNIFICANT CHANGE UP
LYMPHOCYTES # BLD AUTO: 1.18 K/UL — SIGNIFICANT CHANGE UP (ref 1–3.3)
LYMPHOCYTES # BLD AUTO: 21.8 % — SIGNIFICANT CHANGE UP (ref 13–44)
MAGNESIUM SERPL-MCNC: 2 MG/DL — SIGNIFICANT CHANGE UP (ref 1.6–2.6)
MCHC RBC-ENTMCNC: 32.8 PG — SIGNIFICANT CHANGE UP (ref 27–34)
MCHC RBC-ENTMCNC: 33.2 GM/DL — SIGNIFICANT CHANGE UP (ref 32–36)
MCV RBC AUTO: 98.7 FL — SIGNIFICANT CHANGE UP (ref 80–100)
MONOCYTES # BLD AUTO: 0.73 K/UL — SIGNIFICANT CHANGE UP (ref 0–0.9)
MONOCYTES NFR BLD AUTO: 13.5 % — SIGNIFICANT CHANGE UP (ref 2–14)
NEUTROPHILS # BLD AUTO: 3.23 K/UL — SIGNIFICANT CHANGE UP (ref 1.8–7.4)
NEUTROPHILS NFR BLD AUTO: 59.6 % — SIGNIFICANT CHANGE UP (ref 43–77)
NRBC # BLD: 0 /100 WBCS — SIGNIFICANT CHANGE UP (ref 0–0)
PLATELET # BLD AUTO: 149 K/UL — LOW (ref 150–400)
POTASSIUM SERPL-MCNC: 4.3 MMOL/L — SIGNIFICANT CHANGE UP (ref 3.5–5.3)
POTASSIUM SERPL-SCNC: 4.3 MMOL/L — SIGNIFICANT CHANGE UP (ref 3.5–5.3)
RBC # BLD: 3.17 M/UL — LOW (ref 3.8–5.2)
RBC # FLD: 13.2 % — SIGNIFICANT CHANGE UP (ref 10.3–14.5)
SODIUM SERPL-SCNC: 139 MMOL/L — SIGNIFICANT CHANGE UP (ref 135–145)
WBC # BLD: 5.41 K/UL — SIGNIFICANT CHANGE UP (ref 3.8–10.5)
WBC # FLD AUTO: 5.41 K/UL — SIGNIFICANT CHANGE UP (ref 3.8–10.5)

## 2021-07-13 PROCEDURE — 85730 THROMBOPLASTIN TIME PARTIAL: CPT

## 2021-07-13 PROCEDURE — 87046 STOOL CULTR AEROBIC BACT EA: CPT

## 2021-07-13 PROCEDURE — 87635 SARS-COV-2 COVID-19 AMP PRB: CPT

## 2021-07-13 PROCEDURE — 85025 COMPLETE CBC W/AUTO DIFF WBC: CPT

## 2021-07-13 PROCEDURE — 87177 OVA AND PARASITES SMEARS: CPT

## 2021-07-13 PROCEDURE — 87507 IADNA-DNA/RNA PROBE TQ 12-25: CPT

## 2021-07-13 PROCEDURE — 0225U NFCT DS DNA&RNA 21 SARSCOV2: CPT

## 2021-07-13 PROCEDURE — 80053 COMPREHEN METABOLIC PANEL: CPT

## 2021-07-13 PROCEDURE — 87449 NOS EACH ORGANISM AG IA: CPT

## 2021-07-13 PROCEDURE — 81001 URINALYSIS AUTO W/SCOPE: CPT

## 2021-07-13 PROCEDURE — 71045 X-RAY EXAM CHEST 1 VIEW: CPT

## 2021-07-13 PROCEDURE — 86769 SARS-COV-2 COVID-19 ANTIBODY: CPT

## 2021-07-13 PROCEDURE — 87040 BLOOD CULTURE FOR BACTERIA: CPT

## 2021-07-13 PROCEDURE — 99239 HOSP IP/OBS DSCHRG MGMT >30: CPT

## 2021-07-13 PROCEDURE — 74176 CT ABD & PELVIS W/O CONTRAST: CPT

## 2021-07-13 PROCEDURE — 96360 HYDRATION IV INFUSION INIT: CPT

## 2021-07-13 PROCEDURE — 84145 PROCALCITONIN (PCT): CPT

## 2021-07-13 PROCEDURE — 85610 PROTHROMBIN TIME: CPT

## 2021-07-13 PROCEDURE — 96361 HYDRATE IV INFUSION ADD-ON: CPT

## 2021-07-13 PROCEDURE — 71250 CT THORAX DX C-: CPT

## 2021-07-13 PROCEDURE — 99285 EMERGENCY DEPT VISIT HI MDM: CPT | Mod: 25

## 2021-07-13 PROCEDURE — 87045 FECES CULTURE AEROBIC BACT: CPT

## 2021-07-13 PROCEDURE — 87086 URINE CULTURE/COLONY COUNT: CPT

## 2021-07-13 PROCEDURE — 83605 ASSAY OF LACTIC ACID: CPT

## 2021-07-13 PROCEDURE — 36415 COLL VENOUS BLD VENIPUNCTURE: CPT

## 2021-07-13 PROCEDURE — 83735 ASSAY OF MAGNESIUM: CPT

## 2021-07-13 PROCEDURE — 87493 C DIFF AMPLIFIED PROBE: CPT

## 2021-07-13 PROCEDURE — 80048 BASIC METABOLIC PNL TOTAL CA: CPT

## 2021-07-13 PROCEDURE — 93005 ELECTROCARDIOGRAM TRACING: CPT

## 2021-07-13 RX ORDER — VANCOMYCIN HCL 1 G
1 VIAL (EA) INTRAVENOUS
Qty: 44 | Refills: 0
Start: 2021-07-13 | End: 2021-07-23

## 2021-07-13 RX ORDER — LACTOBACILLUS ACIDOPHILUS 100MM CELL
1 CAPSULE ORAL
Qty: 42 | Refills: 0
Start: 2021-07-13 | End: 2021-07-26

## 2021-07-13 RX ORDER — PIPERACILLIN AND TAZOBACTAM 4; .5 G/20ML; G/20ML
0 INJECTION, POWDER, LYOPHILIZED, FOR SOLUTION INTRAVENOUS
Qty: 0 | Refills: 0 | DISCHARGE

## 2021-07-13 RX ADMIN — Medication 1 TABLET(S): at 12:01

## 2021-07-13 RX ADMIN — Medication 125 MILLIGRAM(S): at 12:01

## 2021-07-13 RX ADMIN — MAGNESIUM OXIDE 400 MG ORAL TABLET 400 MILLIGRAM(S): 241.3 TABLET ORAL at 08:16

## 2021-07-13 RX ADMIN — Medication 125 MILLIGRAM(S): at 06:09

## 2021-07-13 RX ADMIN — Medication 1 TABLET(S): at 08:21

## 2021-07-13 NOTE — DISCHARGE NOTE PROVIDER - HOSPITAL COURSE
Hospital Course  81y Female with PMH of IBS and urethral caruncle presented to ED for diarrhea, fever and chills. Prior to onset of these symptoms patient saw a urologist a week ago to have the caruncle evaluated though it was not hurting. She had a UA and culture obtained despite no gu symptoms, found to have pansensitive E. Coli in the urine and commenced vantin. After 4 days she developed fever, chills, worse diarrhea than her baseline hence came to hospital. She has no  symptoms, no flank pain, no urgency or frequency no dysuria no gross hematuria. She does however have malodorous stool that is similar to the cdif she had and has some cramps.   CT showed colitis and C. Diff PCR was positive. GI PCR negative, stool culture and O&P negative. Blood and urine cultures negative. Patient was started on PO vancomycin with improvement in her symptoms. Tolerating PO. She is stable for DC home to complete 14 day course of PO vancomycin.     While admitted patient had ground glass nodules on CT chest, will advise to follow up repeat CT chest in 1-3 months outpatient .    Source of Infection: C. Diff   Antibiotic / Last Day: PO Vancomycin through 7/28    Discharging Provider:  Danielle Avila NP  Contact Info: [phone] - Please call with any questions or concerns.    Outpatient Provider: Dr. Vogt     Signout given to  [facility] Provider:       Hospital Course  81y Female with PMH of IBS and urethral caruncle presented to ED for diarrhea, fever and chills. Prior to onset of these symptoms patient saw a urologist a week ago to have the caruncle evaluated though it was not hurting. She had a UA and culture obtained despite no gu symptoms, found to have pansensitive E. Coli in the urine and commenced vantin. After 4 days she developed fever, chills, worse diarrhea than her baseline hence came to hospital. She has no  symptoms, no flank pain, no urgency or frequency no dysuria no gross hematuria. She does however have malodorous stool that is similar to the cdif she had and has some cramps.   CT showed colitis and C. Diff PCR was positive. GI PCR negative, stool culture and O&P negative. Blood and urine cultures negative. Patient was started on PO vancomycin with improvement in her symptoms. Tolerating PO. She is stable for DC home to complete 14 day course of PO vancomycin.     While admitted patient had ground glass nodules on CT chest, will advise to follow up repeat CT chest in 1-3 months outpatient .    Source of Infection: C. Diff   Antibiotic / Last Day: PO Vancomycin through 7/24    Discharging Provider:  Danielle Avila NP  Contact Info: [phone] - Please call with any questions or concerns.    Outpatient Provider: Dr. Vogt     Signout given to  [facility] Provider:

## 2021-07-13 NOTE — DISCHARGE NOTE PROVIDER - NSDCCPCAREPLAN_GEN_ALL_CORE_FT
PRINCIPAL DISCHARGE DIAGNOSIS  Diagnosis: Colitis due to Clostridioides difficile  Assessment and Plan of Treatment: - You came to the hospital for diarrhea.  - You were diagnosed with C. Diff and started on oral vancomycin.  - Continue taking vancomycin to complete a 14 day course.  - Drink plenty of fluids.  - Please follow up with your primary care doctor   - Return to hospital for any new or worsening symptoms.      SECONDARY DISCHARGE DIAGNOSES  Diagnosis: Abnormal CT of the chest  Assessment and Plan of Treatment: - You CT chest showed bilateral lung nodules.  - Please follow up with your doctor to have your CT chest repeated in 1-3 months.     PRINCIPAL DISCHARGE DIAGNOSIS  Diagnosis: Colitis due to Clostridioides difficile  Assessment and Plan of Treatment: - You came to the hospital for diarrhea.  - You were diagnosed with C. Diff and started on oral vancomycin.  - Continue taking vancomycin to complete a 14 day course, to be complete on 7/24  - Drink plenty of fluids.  - Please follow up with your primary care doctor   - Return to hospital for any new or worsening symptoms.      SECONDARY DISCHARGE DIAGNOSES  Diagnosis: Abnormal CT of the chest  Assessment and Plan of Treatment: - You CT chest showed bilateral lung nodules.  - Please follow up with your doctor to have your CT chest repeated in 1-3 months.

## 2021-07-13 NOTE — DISCHARGE NOTE NURSING/CASE MANAGEMENT/SOCIAL WORK - PATIENT PORTAL LINK FT
You can access the FollowMyHealth Patient Portal offered by [facility] by registering at the following website: [email or web address]. By joining [facility]’s FollowMyHealth portal, you will also be able to view your health information using other applications (apps) compatible with our system.

## 2021-07-13 NOTE — PROGRESS NOTE ADULT - ASSESSMENT
81 female with no significant past medical history other than IBS admitted with a UTI, now with diarrhea 2/2 C. Diff     #Colitis 2/2 C. Diff  - Developed diarrhea after started on PO abx outpatient for UTI  - C Diff PCR positive  - GI PCR negative, stool culture and O&P negative  - Blood and urine cultures negative   - F/u urine legionella  - Isolation precautions  - Continue with oral vanco - day 4  - ID following    #Abnormal CT chest  - Ground glass nodules on CT chest  - Follow up repeat CT chest in 1-3 months outpatient .    #Asymptomatic bacteruria   - Pt with some urinary urgency (baseline), but no dysuria or hematuria for past several weeks  - Saw urology outpatient and routine UA/culture revealed pan sensitive E coli  - Initially prescribed vantin, was started on levaquin on admission  - Then developed diarrhea  - Levaquin stopped, PO vancomycin started for C. Diff   - Continue PO vanco  - ID following     #Hypokalemia - resolved  #Hypomagnesemia - resolved   - Monitor     #DVT ppx  - Lovenox    Dispo: DC home when cleared by ID on PO vanco to finish course   81 female with no significant past medical history other than IBS admitted with a UTI, now with diarrhea 2/2 C. Diff     #Colitis 2/2 C. Diff  - Developed diarrhea after started on PO abx outpatient for UTI  - C Diff PCR positive  - GI PCR negative, stool culture and O&P negative  - Blood and urine cultures negative   - F/u urine legionella  - Isolation precautions  - Continue with oral vanco - day 4  - ID following    #Abnormal CT chest  - Ground glass nodules on CT chest  - Follow up repeat CT chest in 1-3 months outpatient .    #Asymptomatic bacteruria   - Pt with some urinary urgency (baseline), but no dysuria or hematuria for past several weeks  - Saw urology outpatient and routine UA/culture revealed pan sensitive E coli  - Initially prescribed vantin, was started on levaquin on admission  - Then developed diarrhea  - Levaquin stopped, PO vancomycin started for C. Diff   - Continue PO vanco  - ID following     #Hypokalemia - resolved  #Hypomagnesemia - resolved   - Monitor     #DVT ppx  - Lovenox    Dispo: DC home when cleared by ID on PO vanco to finish course - possible DC home today

## 2021-07-13 NOTE — DISCHARGE NOTE PROVIDER - NSDCMRMEDTOKEN_GEN_ALL_CORE_FT
Xanax 0.25 mg oral tablet: 2 tab(s) orally once a day (at bedtime)   Acidophilus oral capsule: 1 cap(s) orally 3 times a day   vancomycin 125 mg oral capsule: 1 cap(s) orally every 6 hours  Xanax 0.25 mg oral tablet: 2 tab(s) orally once a day (at bedtime)

## 2021-07-13 NOTE — PROGRESS NOTE ADULT - SUBJECTIVE AND OBJECTIVE BOX
Patient is a 81y old  Female who presents with a chief complaint of UTI (13 Jul 2021 07:56)    Patient seen and examined at bedside. Patient reports that diarrhea is improving, had once loose stool last night and this morning.     ALLERGIES:  lactose (Stomach Upset; Diarrhea)  Macrobid (Unknown)  sulfa drugs (Unknown)    MEDICATIONS  (STANDING):  ALPRAZolam 0.5 milliGRAM(s) Oral at bedtime  enoxaparin Injectable 40 milliGRAM(s) SubCutaneous daily  lactobacillus acidophilus 1 Tablet(s) Oral three times a day with meals  vancomycin    Solution 125 milliGRAM(s) Oral every 6 hours    MEDICATIONS  (PRN):  acetaminophen   Tablet .. 650 milliGRAM(s) Oral every 6 hours PRN Temp greater or equal to 38C (100.4F), Mild Pain (1 - 3)  ondansetron Injectable 4 milliGRAM(s) IV Push every 8 hours PRN Nausea and/or Vomiting    Vital Signs Last 24 Hrs  T(F): 97.2 (13 Jul 2021 07:18), Max: 98.5 (12 Jul 2021 21:00)  HR: 68 (13 Jul 2021 07:18) (68 - 69)  BP: 113/73 (13 Jul 2021 07:18) (106/49 - 122/57)  RR: 18 (13 Jul 2021 07:18) (18 - 18)  SpO2: --    I&O's Summary  13 Jul 2021 07:01  -  13 Jul 2021 11:14  --------------------------------------------------------  IN: 220 mL / OUT: 0 mL / NET: 220 mL    PHYSICAL EXAM:  GENERAL: NAD, well-groomed, well-developed  HEAD:  Atraumatic, Normocephalic  ENMT: Moist mucous membranes, Good dentition  NECK: Supple, No JVD  CHEST/LUNG: Clear to auscultation bilaterally, good air entry, non-labored breathing  HEART: RRR; S1/S2, No murmur  ABDOMEN: Soft, Nontender, Nondistended; Bowel sounds present  VASCULAR: Normal pulses, Normal capillary refill  EXTREMITIES: No calf tenderness, No cyanosis, No edema  SKIN: Warm, Intact  NERVOUS SYSTEM:  A/O x3, No focal deficits    LABS:                        10.4   5.41  )-----------( 149      ( 13 Jul 2021 05:00 )             31.3     07-13    139  |  106  |  12  ----------------------------<  103  4.3   |  26  |  0.71    Ca    8.9      13 Jul 2021 05:00  Mg     2.0     07-13    eGFR if Non African American: 81 mL/min/1.73M2 (07-13-21 @ 05:00)  eGFR if African American: 94 mL/min/1.73M2 (07-13-21 @ 05:00)    Culture - Stool (collected 10 Jul 2021 12:06)  Source: .Stool Feces  Final Report (12 Jul 2021 17:35):    No enteric pathogens isolated.    (Stool culture examined for Salmonella,    Shigella, Campylobacter, Aeromonas, Plesiomonas,    Vibrio, E.coli O157 and Yersinia)    No enteric gram negative rods isolated    GI PCR Panel, Stool (collected 10 Jul 2021 12:06)  Source: .Stool Feces  Final Report (10 Jul 2021 23:11):    GI PCR Results: NOT detected    *******Please Note:*******    GI panel PCR evaluates for:    Campylobacter, Plesiomonas shigelloides, Salmonella,    Vibrio, Yersinia enterocolitica, Enteroaggregative    Escherichia coli (EAEC), Enteropathogenic E.coli (EPEC),    Enterotoxigenic E. coli (ETEC) lt/st, Shiga-like    toxin-producing E. coli (STEC) stx1/stx2,    Shigella/ Enteroinvasive E. coli (EIEC), Cryptosporidium,    Cyclospora cayetanensis, Entamoeba histolytica,    Giardia lamblia, Adenovirus F 40/41, Astrovirus,    Norovirus GI/GII, Rotavirus A, Sapovirus    Culture - Urine (collected 09 Jul 2021 20:30)  Source: .Urine Clean Catch (Midstream)  Final Report (11 Jul 2021 03:40):    <10,000 CFU/mL Normal Urogenital Nohemi    Culture - Blood (collected 09 Jul 2021 20:30)  Source: .Blood Blood-Peripheral  Preliminary Report (11 Jul 2021 02:00):    No growth to date.    Culture - Blood (collected 09 Jul 2021 20:30)  Source: .Blood Blood-Peripheral  Preliminary Report (11 Jul 2021 02:00):    No growth to date.    COVID-19 PCR: NotDetec (07-09-21 @ 22:09)    RADIOLOGY & ADDITIONAL TESTS:    Care Discussed with Consultants/Other Providers:

## 2021-07-13 NOTE — PROGRESS NOTE ADULT - ATTENDING COMMENTS
81 female with no significant past medical history other than IBS admitted with a UTI now with c diff infection    # Colitis 2/2 c diff  - pt with hx of IBS, foul smelling diarrhea, persistent fever, now improved  - c diff PCR positive  - continue with oral vanco  - discussed case with ID, patient can go home with total of 14 days PO vanco    # Abnormal CT chest  - ground glass nodules on CT chest  - follow up repeat CT chest in 1-3 months outpatient

## 2021-07-14 LAB
CULTURE RESULTS: SIGNIFICANT CHANGE UP
SPECIMEN SOURCE: SIGNIFICANT CHANGE UP

## 2021-07-15 LAB
CULTURE RESULTS: SIGNIFICANT CHANGE UP
CULTURE RESULTS: SIGNIFICANT CHANGE UP
SPECIMEN SOURCE: SIGNIFICANT CHANGE UP
SPECIMEN SOURCE: SIGNIFICANT CHANGE UP

## 2021-07-27 ENCOUNTER — APPOINTMENT (OUTPATIENT)
Dept: OBGYN | Facility: CLINIC | Age: 81
End: 2021-07-27

## 2021-09-10 PROBLEM — K58.9 IRRITABLE BOWEL SYNDROME WITHOUT DIARRHEA: Chronic | Status: ACTIVE | Noted: 2021-07-09

## 2021-09-10 PROBLEM — K58.9 IRRITABLE BOWEL SYNDROME, UNSPECIFIED: Chronic | Status: ACTIVE | Noted: 2021-07-09

## 2021-10-08 ENCOUNTER — OUTPATIENT (OUTPATIENT)
Dept: OUTPATIENT SERVICES | Facility: HOSPITAL | Age: 81
LOS: 1 days | End: 2021-10-08
Payer: MEDICARE

## 2021-10-08 ENCOUNTER — APPOINTMENT (OUTPATIENT)
Dept: RADIOLOGY | Facility: HOSPITAL | Age: 81
End: 2021-10-08
Payer: MEDICARE

## 2021-10-08 DIAGNOSIS — Z78.0 ASYMPTOMATIC MENOPAUSAL STATE: ICD-10-CM

## 2021-10-08 DIAGNOSIS — Z13.820 ENCOUNTER FOR SCREENING FOR OSTEOPOROSIS: ICD-10-CM

## 2021-10-08 DIAGNOSIS — Z98.890 OTHER SPECIFIED POSTPROCEDURAL STATES: Chronic | ICD-10-CM

## 2021-10-08 DIAGNOSIS — Z98.891 HISTORY OF UTERINE SCAR FROM PREVIOUS SURGERY: Chronic | ICD-10-CM

## 2021-10-08 PROCEDURE — 77080 DXA BONE DENSITY AXIAL: CPT | Mod: 26

## 2021-10-08 PROCEDURE — 77080 DXA BONE DENSITY AXIAL: CPT

## 2022-03-25 ENCOUNTER — APPOINTMENT (OUTPATIENT)
Dept: NEUROLOGY | Facility: CLINIC | Age: 82
End: 2022-03-25

## 2022-03-28 ENCOUNTER — APPOINTMENT (OUTPATIENT)
Dept: NEUROLOGY | Facility: CLINIC | Age: 82
End: 2022-03-28
Payer: MEDICARE

## 2022-03-28 VITALS
DIASTOLIC BLOOD PRESSURE: 74 MMHG | SYSTOLIC BLOOD PRESSURE: 108 MMHG | WEIGHT: 110 LBS | HEART RATE: 78 BPM | HEIGHT: 65 IN | BODY MASS INDEX: 18.33 KG/M2

## 2022-03-28 DIAGNOSIS — Z78.9 OTHER SPECIFIED HEALTH STATUS: ICD-10-CM

## 2022-03-28 PROCEDURE — 99205 OFFICE O/P NEW HI 60 MIN: CPT

## 2022-03-28 NOTE — PHYSICAL EXAM
[FreeTextEntry1] : Head:  Normocephalic Neck: Supple nontender no carotid bruits.  Spine: Scoliosis nontender negative straight leg raising.\par \par Mental Status:  Alert Oriented X3 Speech normal and no aphasia or dysarthria.\par \par Cranial Nerves:  PERRL, Fundi normal Visual Fields full  EOMI no diplopia no ptosis no nystagmus, V through XII intact.\par \par Motor:  No drift, normal strength tone and coordination and no focal atrophy. No abnormal movements. No dysmetria.  Normal rapid alternating movements. \par \par DTRs: Symmetric and 2+.  Plantars flexor.  No Clonus.\par \par Sensory:  Normal testing with pin light touch and position sense.  Vibration decreased involving the distal lower extremities.\par \par Gait: Unremarkable able to attempt tandem walking negative Romberg.\par

## 2022-03-28 NOTE — CONSULT LETTER
[Dear  ___] : Dear  [unfilled], [Consult Letter:] : I had the pleasure of evaluating your patient, [unfilled]. [Please see my note below.] : Please see my note below. [Consult Closing:] : Thank you very much for allowing me to participate in the care of this patient.  If you have any questions, please do not hesitate to contact me. [Sincerely,] : Sincerely, [FreeTextEntry3] : Theo Oneil MD\par

## 2022-03-28 NOTE — HISTORY OF PRESENT ILLNESS
[FreeTextEntry1] : Marianela Silva is seen for an office consultation.  She is an 82-year-old right-handed female patient to states that for the last 15 years she has had tingling in both feet which initially began after she was recovering from C. difficile.  In this setting she has a history of chronic lower back pain with scoliosis and lumbar MRI on 8/4/2021 I did reveal scoliosis grade 1 anterolisthesis L3-L4 and L4-L5 and spondylosis at several levels.  She describes a 3 to 4month history of a generalized tingling involving scalp and face and both hands.  This complaint is intermittent.  For least the last 10 years she has had decreased vibration sense on physical exam affecting both distal lower extremities noted by internist and podiatrist.  She denies any problems with position sense or proprioception.  She denies any problems with gait balance or motor weakness.  She has some chronic lower back pain into bilateral buttocks but she denies any pain in the face trunk or the upper or lower extremities.  She did have Cramer vaccine for Covid and boosters.  She questions whether the paresthesia which began 3 to 4 months ago might be associated with the booster that she received on 10/22/2021.  She denies ever having a Covid virus.\par \par Blood tests from February and March 2021 revealed ESR of 26 and 20 and ASHELY +1: 160 and 1: 80.  Years ago for dry eyes she had blood test for Sjogren's syndrome which were negative.  Those results are not available.\par \par Past history otherwise basically unremarkable.  Medications include Xanax at bedtime estrogen cream and occasional Sudafed.\par \par She has a half a glass of wine in the evening she is a non-smoker.\par \par Family history is negative for neuropathy.

## 2022-03-28 NOTE — ASSESSMENT
[FreeTextEntry1] : Impression: This 82-year-old female patient has a chronic history for the last 15 years of tingling in both feet.  She has had decreased vibration sense chronically in the distal lower extremities.  This setting for the last 3 to 4 months she describes some intermittent paresthesia affecting her scalp face and the bilateral fingertips.  There is been no progression of her lower extremity distal paresthesia.  She denies any problems with motor function gait or balance.  She has a chronic low back syndrome with degenerative change on MRI chronic scoliosis.  Neurological exam is remarkable only for some mild decreased vibration sense distally in the lowers with intact position sense remainder of the neurological exam is unremarkable.  Suspect she may have a chronic sensory peripheral neuropathy.  The paresthesia that she describes especially last 3 to 4 months could suggest the small fiber neuropathy which may be idiopathic.  In any case her presentation generally appears to be benign.  Blood tests are unremarkable.  She denies any other underlying medical conditions that would be associated with neuropathy.\par \par Recommendations: Reassurance to the patient.  To complete the blood test work-up I did suggest serum protein electrophoresis.  Office follow-up in 4 months or else as needed.  Defer further work-up such as EMG/NCV at this juncture.\par

## 2022-04-04 ENCOUNTER — NON-APPOINTMENT (OUTPATIENT)
Age: 82
End: 2022-04-04

## 2022-04-04 LAB
ALBUMIN MFR SERPL ELPH: 64.4 %
ALBUMIN SERPL-MCNC: 4.5 G/DL
ALBUMIN/GLOB SERPL: 1.8 RATIO
ALPHA1 GLOB MFR SERPL ELPH: 3.3 %
ALPHA1 GLOB SERPL ELPH-MCNC: 0.2 G/DL
ALPHA2 GLOB MFR SERPL ELPH: 8.9 %
ALPHA2 GLOB SERPL ELPH-MCNC: 0.6 G/DL
B-GLOBULIN MFR SERPL ELPH: 10.7 %
B-GLOBULIN SERPL ELPH-MCNC: 0.7 G/DL
GAMMA GLOB FLD ELPH-MCNC: 0.9 G/DL
GAMMA GLOB MFR SERPL ELPH: 12.7 %
INTERPRETATION SERPL IEP-IMP: NORMAL
PROT SERPL-MCNC: 7 G/DL
PROT SERPL-MCNC: 7 G/DL

## 2022-04-26 ENCOUNTER — APPOINTMENT (OUTPATIENT)
Dept: MAMMOGRAPHY | Facility: HOSPITAL | Age: 82
End: 2022-04-26
Payer: MEDICARE

## 2022-04-26 ENCOUNTER — OUTPATIENT (OUTPATIENT)
Dept: OUTPATIENT SERVICES | Facility: HOSPITAL | Age: 82
LOS: 1 days | End: 2022-04-26
Payer: MEDICARE

## 2022-04-26 DIAGNOSIS — Z98.890 OTHER SPECIFIED POSTPROCEDURAL STATES: Chronic | ICD-10-CM

## 2022-04-26 DIAGNOSIS — Z98.891 HISTORY OF UTERINE SCAR FROM PREVIOUS SURGERY: Chronic | ICD-10-CM

## 2022-04-26 DIAGNOSIS — Z12.31 ENCOUNTER FOR SCREENING MAMMOGRAM FOR MALIGNANT NEOPLASM OF BREAST: ICD-10-CM

## 2022-04-26 PROCEDURE — 77065 DX MAMMO INCL CAD UNI: CPT | Mod: 26,GG,RT

## 2022-04-26 PROCEDURE — 77067 SCR MAMMO BI INCL CAD: CPT | Mod: 26,59

## 2022-04-26 PROCEDURE — 77065 DX MAMMO INCL CAD UNI: CPT

## 2022-04-26 PROCEDURE — 77067 SCR MAMMO BI INCL CAD: CPT

## 2022-04-26 PROCEDURE — 77063 BREAST TOMOSYNTHESIS BI: CPT

## 2022-04-26 PROCEDURE — 77063 BREAST TOMOSYNTHESIS BI: CPT | Mod: 26,59

## 2022-04-28 ENCOUNTER — APPOINTMENT (OUTPATIENT)
Dept: NEUROLOGY | Facility: CLINIC | Age: 82
End: 2022-04-28

## 2022-06-23 ENCOUNTER — APPOINTMENT (OUTPATIENT)
Dept: NEUROLOGY | Facility: CLINIC | Age: 82
End: 2022-06-23
Payer: MEDICARE

## 2022-06-23 VITALS
WEIGHT: 112 LBS | BODY MASS INDEX: 18.66 KG/M2 | SYSTOLIC BLOOD PRESSURE: 112 MMHG | HEIGHT: 65 IN | DIASTOLIC BLOOD PRESSURE: 72 MMHG | HEART RATE: 80 BPM

## 2022-06-23 PROCEDURE — 99214 OFFICE O/P EST MOD 30 MIN: CPT

## 2022-06-23 NOTE — PHYSICAL EXAM
[FreeTextEntry1] : Head:  Normocephalic Neck: Only mild restriction negative Lhermitte sign nontender no carotid bruits.  Spine: Scoliosis nontender negative straight leg raising\par \par Mental Status:  Alert Oriented X3 Speech normal and no aphasia or dysarthria.\par \par Cranial Nerves:  PERRL, Fundi not visualized.  Visual Fields full  EOMI no diplopia no ptosis no nystagmus, V through XII intact.\par \par Motor: There may be mild weakness of the left biceps otherwise strength is basically intact throughout.  There is no focal atrophy or fasciculation.  Tone is basically normal and there is no dysmetria.\par \par DTRs: Symmetric and 2+.  Plantars flexor.  No Clonus.\par \par Sensory: Vibration sense decreased distally lower extremities as noted.\par \par Gait: Basically unchanged spinal deformity able to tandem walk negative Romberg.\par

## 2022-06-23 NOTE — HISTORY OF PRESENT ILLNESS
[FreeTextEntry1] : Marianela Silva is seen for an office visit.  She was last seen by me on 3/28/2022 when she presented with chronic symptoms of multifocal paresthesia.  She did have decreased vibration sense chronically in the distal lower extremities.  It was thought that she might have a and idiopathic sensory peripheral neuropathy.  There is also a chronic low back syndrome with degenerative changes on MRI and a history of chronic scoliosis.\par \par She now presents with history of chronic neck pain.  She is receiving physical therapy for neck and back.  She saw a rehabilitation specialist who diagnosed spondylolisthesis and degenerative disc disease of the cervical spine by x-ray and suggested an MRI.  She is here for another opinion.  She is not having any pain rating to the extremities.  Occasionally with neck flexion she may get some paresthesia into the spine.  She denies any motor weakness or loss of coordination of her upper or lower extremities.  There is no history of cervical trauma.  She is not having any cranial symptoms.  The sensory complaints she is describing she was seen in March 2020 for a moderated somewhat.  There is no progression.\par \par

## 2022-06-23 NOTE — ASSESSMENT
[FreeTextEntry1] : Impression: This 82-year-old female patient presents with several months of chronic neck pain and stiffness.  Neurological exam is remarkable for perhaps some mild weakness of left biceps and decreased vibration sense distally in the lowers.  She does not have radicular pathic symptomatology.  Her exam does not reveal definite findings of myelopathy.  Suspect degenerative cervical spine condition such as spondylosis.\par \par Patient was seen by me in March 2022 for findings suggesting a chronic sensory peripheral neuropathy with multifocal paresthesia.  She has a chronic decreased vibration sense in both lower extremities by history.\par \par Recommendations: Conservative management.  MRI cervical spine.  Physical therapy.  Office follow-up.  Consider EMG/NCV upper extremities which will be deferred at this juncture\par

## 2022-06-27 ENCOUNTER — OUTPATIENT (OUTPATIENT)
Dept: OUTPATIENT SERVICES | Facility: HOSPITAL | Age: 82
LOS: 1 days | End: 2022-06-27
Payer: MEDICARE

## 2022-06-27 ENCOUNTER — APPOINTMENT (OUTPATIENT)
Dept: MRI IMAGING | Facility: HOSPITAL | Age: 82
End: 2022-06-27

## 2022-06-27 DIAGNOSIS — M47.12 OTHER SPONDYLOSIS WITH MYELOPATHY, CERVICAL REGION: ICD-10-CM

## 2022-06-27 DIAGNOSIS — Z98.890 OTHER SPECIFIED POSTPROCEDURAL STATES: Chronic | ICD-10-CM

## 2022-06-27 DIAGNOSIS — Z98.891 HISTORY OF UTERINE SCAR FROM PREVIOUS SURGERY: Chronic | ICD-10-CM

## 2022-06-27 PROCEDURE — 72141 MRI NECK SPINE W/O DYE: CPT | Mod: 26,MH

## 2022-06-27 PROCEDURE — 72141 MRI NECK SPINE W/O DYE: CPT

## 2022-07-11 ENCOUNTER — APPOINTMENT (OUTPATIENT)
Dept: NEUROLOGY | Facility: CLINIC | Age: 82
End: 2022-07-11

## 2022-07-11 VITALS
HEIGHT: 65 IN | BODY MASS INDEX: 18.66 KG/M2 | SYSTOLIC BLOOD PRESSURE: 112 MMHG | WEIGHT: 112 LBS | DIASTOLIC BLOOD PRESSURE: 74 MMHG | HEART RATE: 78 BPM

## 2022-07-11 DIAGNOSIS — M47.12 OTHER SPONDYLOSIS WITH MYELOPATHY, CERVICAL REGION: ICD-10-CM

## 2022-07-11 PROCEDURE — 99214 OFFICE O/P EST MOD 30 MIN: CPT

## 2022-07-11 NOTE — ASSESSMENT
[FreeTextEntry1] : Impression: This 82-year-old female patient has several months of chronic neck pain.  Neurological exam does not reveal any definite findings of cervical myeloradiculopathy.  She has paresthesia affecting both hands and chronically in both lower extremities with decreased vibration sense.  These findings could be due to sensory neuropathy.  Rule out manifestations of spinal cord syndrome  but as noted neurological exam does not reveal any signs of myeloradiculopathy .  The cervical MRI reveals significant spondylosis and spinal stenosis without any abnormalities of spinal cord signal.\par \par Recommendations: Conservative management.  Defer surgical intervention.  Office follow-up in 3 months or else as needed nathan with the patient and her  in detail.\par

## 2022-07-11 NOTE — PHYSICAL EXAM
[FreeTextEntry1] : Head:  Normocephalic Neck: Supple nontender mild restriction.  No carotid bruits.  Spine:  Nontender negative straight leg raising.\par \par Mental Status:  Alert Oriented X3 Speech normal and no aphasia or dysarthria.\par \par Cranial Nerves:  PERRL,Visual Fields full  EOMI no diplopia no ptosis no nystagmus, V through XII intact.\par \par Motor: On today's exam there is good strength throughout no focal atrophy normal tone no fasciculation or dysmetria.\par \par DTRs: Symmetric and 2+.  Plantars flexor.  No Clonus.\par \par Sensory: Decreased vibration sense distally in the lowers unchanged.  Normal in the uppers.\par \par Gait: Spinal deformity unchanged.  Gait regular able to tandem walk.\par

## 2022-07-11 NOTE — HISTORY OF PRESENT ILLNESS
[FreeTextEntry1] : Marianela Silva is seen for an office visit.  She has ongoing neck discomfort which is mild to moderate.  She has had physical therapy.  She has some chronic paresthesia in both hands and in the lower extremities but not affecting her scalp.  The paresthesia in the lower extremities has been present for years and she has known decreased vibration sense in the lower limbs for least 15 years.  The symptoms in the hands are more recent.  She did have a cervical spine x-ray revealing spondylolisthesis and degenerative disc disease.  She denies motor weakness of the extremities or loss of dexterity gait or balance.\par \par She had a cervical spine MRI performed on 6/27/2022 this was a noncontrast study.  There is evidence of multilevel moderate to severe cervical spondylosis with kyphosis mild chronic loss of vertebral body height at C5 and C6 and moderate to severe central stenosis at C5-6 and C6-7 and severe right neuroforaminal stenosis at C7-T1.  There was flattening of the cord noted but there was normal spinal cord signal in size and signal intensity.

## 2022-07-28 ENCOUNTER — APPOINTMENT (OUTPATIENT)
Dept: NEUROLOGY | Facility: CLINIC | Age: 82
End: 2022-07-28

## 2022-09-01 ENCOUNTER — NON-APPOINTMENT (OUTPATIENT)
Age: 82
End: 2022-09-01

## 2022-09-07 ENCOUNTER — NON-APPOINTMENT (OUTPATIENT)
Age: 82
End: 2022-09-07

## 2022-09-19 ENCOUNTER — NON-APPOINTMENT (OUTPATIENT)
Age: 82
End: 2022-09-19

## 2022-10-04 ENCOUNTER — NON-APPOINTMENT (OUTPATIENT)
Age: 82
End: 2022-10-04

## 2022-10-11 ENCOUNTER — APPOINTMENT (OUTPATIENT)
Dept: NEUROLOGY | Facility: CLINIC | Age: 82
End: 2022-10-11

## 2022-10-11 VITALS
BODY MASS INDEX: 18.78 KG/M2 | HEIGHT: 64 IN | HEART RATE: 80 BPM | SYSTOLIC BLOOD PRESSURE: 114 MMHG | DIASTOLIC BLOOD PRESSURE: 74 MMHG | WEIGHT: 110 LBS

## 2022-10-11 PROCEDURE — 99214 OFFICE O/P EST MOD 30 MIN: CPT

## 2022-10-11 NOTE — ASSESSMENT
[FreeTextEntry1] : Impression: This 82-year-old female patient has cervical spondylosis with areas of moderate to severe spinal stenosis with chronic cervical pain.  Neurological exam is without findings of myeloradiculopathy and the patient is clinically improved with physical therapy.  She has generalized paresthesia which could be consistent with a sensory neuropathy.\par \par Recommendations: Conservative management with physical therapy defer surgical intervention.  Office follow-up in 6 months or else as needed.\par

## 2022-10-11 NOTE — PHYSICAL EXAM
[FreeTextEntry1] : Head:  Normocephalic Neck: Supple improve range of motion nontender no carotid bruits. \par \par Mental Status:  Alert Oriented X3 Speech normal and no aphasia or dysarthria.\par \par Cranial Nerves:  PERRL, Fundi not visualized.  Visual Fields full  EOMI no diplopia no ptosis no nystagmus, V through XII intact.\par \par Motor:  No drift, normal strength tone and coordination and no focal atrophy. No abnormal movements. No dysmetria.  Normal rapid alternating movements. \par \par DTRs: Symmetric and 2+.  Plantars flexor.  No Clonus.\par \par Sensory: Unchanged reduced vibration distally in the lowers.\par \par Gait: Regular spinal deformity.\par

## 2022-10-11 NOTE — HISTORY OF PRESENT ILLNESS
[FreeTextEntry1] : This patient is seen for an office visit.  There is improvement in her neck discomfort with physical therapy.  She gets occasional paresthesia in her hands and lower extremities as previously described without any worsening of her condition.  She occasionally has paresthesias posterior scalp.  There is general improvement in her condition with improved cervical range of motion.  Cervical MRI on 6/27/2022 did reveal cervical spondylosis with kyphosis with areas of moderate to severe central canal stenosis at C5-6 and C6-7 and there is foraminal stenosis at several levels with normal spinal cord signal.

## 2023-01-16 ENCOUNTER — APPOINTMENT (OUTPATIENT)
Dept: ULTRASOUND IMAGING | Facility: HOSPITAL | Age: 83
End: 2023-01-16

## 2023-01-18 ENCOUNTER — APPOINTMENT (OUTPATIENT)
Dept: OBGYN | Facility: CLINIC | Age: 83
End: 2023-01-18

## 2023-03-07 ENCOUNTER — APPOINTMENT (OUTPATIENT)
Dept: FAMILY MEDICINE | Facility: CLINIC | Age: 83
End: 2023-03-07
Payer: MEDICARE

## 2023-03-07 ENCOUNTER — NON-APPOINTMENT (OUTPATIENT)
Age: 83
End: 2023-03-07

## 2023-03-07 VITALS
HEIGHT: 64 IN | BODY MASS INDEX: 19.46 KG/M2 | WEIGHT: 114 LBS | HEART RATE: 72 BPM | SYSTOLIC BLOOD PRESSURE: 138 MMHG | OXYGEN SATURATION: 96 % | RESPIRATION RATE: 16 BRPM | DIASTOLIC BLOOD PRESSURE: 68 MMHG | TEMPERATURE: 98.1 F

## 2023-03-07 DIAGNOSIS — F41.9 ANXIETY DISORDER, UNSPECIFIED: ICD-10-CM

## 2023-03-07 DIAGNOSIS — S01.80XD UNSPECIFIED OPEN WOUND OF OTHER PART OF HEAD, SUBSEQUENT ENCOUNTER: ICD-10-CM

## 2023-03-07 DIAGNOSIS — R53.83 OTHER FATIGUE: ICD-10-CM

## 2023-03-07 PROCEDURE — 99204 OFFICE O/P NEW MOD 45 MIN: CPT

## 2023-03-07 NOTE — HISTORY OF PRESENT ILLNESS
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 Has been doing kegel exercises 3 times daily (after meals) and it did help in the past, but now is not helping. Interested in trying cymbalta for this problem. Has a h/o anemia which she has been told is 'age related.' Also suffers from peripheral neuropathy. Also suffers from dry eyes, chronic rhinitis, spondylolisthesis C5-7, lower back radiculopathy, h/o C.diff. for which she has seen Karan Coleman, ID. Has been hospitalized twice with C. diff. \par \par PMH: anxiety/depression treated for 15 years by Dr. Javier, takes alprazolam .5 mg at bedtime, osteoporosis\par \par diet: regular, not vegetarian\par \par meds: restasis 0.5%, estradiol 0.01 cream, alprazolam 0.5 qhs, dicofenac SOB EC 25 mg bid and misoprostaol 100 mcg bid (started last week, rec by Dr. Mccoy), mupirocin ointment 2% as needed\par \par Dr. Coleman rec Fosfomycin Tromethamine for UTIs and prophylacic vancomycin with any other antibiotics to avoid C. diff. \par \par Physicians: Dr. Adam, Dr. Rivas, Dr. Oneil, Dr. Coleman, Dr. Mccoy\par PT-[facility]\par \par supplements: D3, unsure of dose, MVI, vitamin for bones that is out of stock at [facility], probiotic \par \par ROS: negative except as noted above\par

## 2023-03-07 NOTE — ASSESSMENT
[FreeTextEntry1] : Marianela declines treatment for osteoporosis; "I'm afraid of it" supplements and weight-bearing exercise discussed although this is not recommended without bisphosphonates.\par will start cymbalta 20 mg daily, instructions reviewed\par discussed RBA of alprazolam, goal to decrease or stop this medication\par RTO 1-3 months for f/u and CPE

## 2023-03-07 NOTE — PHYSICAL EXAM
[No Acute Distress] : no acute distress [Well Nourished] : well nourished [Well Developed] : well developed [Well-Appearing] : well-appearing [Normal Voice/Communication] : normal voice/communication [Normal Sclera/Conjunctiva] : normal sclera/conjunctiva [Normal Outer Ear/Nose] : the outer ears and nose were normal in appearance [Normal Oropharynx] : the oropharynx was normal [No Lymphadenopathy] : no lymphadenopathy [Supple] : supple [No Respiratory Distress] : no respiratory distress  [No Accessory Muscle Use] : no accessory muscle use [Clear to Auscultation] : lungs were clear to auscultation bilaterally [Normal Rate] : normal rate  [Regular Rhythm] : with a regular rhythm [Normal S1, S2] : normal S1 and S2

## 2023-03-09 LAB
25(OH)D3 SERPL-MCNC: 56.6 NG/ML
ALBUMIN SERPL ELPH-MCNC: 4.7 G/DL
ALP BLD-CCNC: 38 U/L
ALT SERPL-CCNC: 24 U/L
ANION GAP SERPL CALC-SCNC: 12 MMOL/L
AST SERPL-CCNC: 22 U/L
BASOPHILS # BLD AUTO: 0.07 K/UL
BASOPHILS NFR BLD AUTO: 1.5 %
BILIRUB SERPL-MCNC: 0.6 MG/DL
BUN SERPL-MCNC: 18 MG/DL
CALCIUM SERPL-MCNC: 9.8 MG/DL
CHLORIDE SERPL-SCNC: 103 MMOL/L
CHOLEST SERPL-MCNC: 203 MG/DL
CO2 SERPL-SCNC: 25 MMOL/L
CREAT SERPL-MCNC: 0.69 MG/DL
EGFR: 87 ML/MIN/1.73M2
EOSINOPHIL # BLD AUTO: 0.19 K/UL
EOSINOPHIL NFR BLD AUTO: 4.2 %
ERYTHROCYTE [SEDIMENTATION RATE] IN BLOOD BY WESTERGREN METHOD: 15 MM/HR
FOLATE SERPL-MCNC: >20 NG/ML
GLUCOSE SERPL-MCNC: 95 MG/DL
HCT VFR BLD CALC: 36.4 %
HDLC SERPL-MCNC: 99 MG/DL
HGB BLD-MCNC: 11.6 G/DL
IMM GRANULOCYTES NFR BLD AUTO: 0.2 %
LDLC SERPL CALC-MCNC: 91 MG/DL
LYMPHOCYTES # BLD AUTO: 1.51 K/UL
LYMPHOCYTES NFR BLD AUTO: 33.2 %
MAN DIFF?: NORMAL
MCHC RBC-ENTMCNC: 31.9 GM/DL
MCHC RBC-ENTMCNC: 33 PG
MCV RBC AUTO: 103.4 FL
MONOCYTES # BLD AUTO: 0.57 K/UL
MONOCYTES NFR BLD AUTO: 12.5 %
NEUTROPHILS # BLD AUTO: 2.2 K/UL
NEUTROPHILS NFR BLD AUTO: 48.4 %
NONHDLC SERPL-MCNC: 104 MG/DL
PLATELET # BLD AUTO: 179 K/UL
POTASSIUM SERPL-SCNC: 4.5 MMOL/L
PROT SERPL-MCNC: 6.7 G/DL
RBC # BLD: 3.52 M/UL
RBC # FLD: 14.8 %
SODIUM SERPL-SCNC: 139 MMOL/L
TRIGL SERPL-MCNC: 68 MG/DL
TSH SERPL-ACNC: 3.71 UIU/ML
VIT B12 SERPL-MCNC: 1458 PG/ML
WBC # FLD AUTO: 4.55 K/UL

## 2023-03-10 ENCOUNTER — LABORATORY RESULT (OUTPATIENT)
Age: 83
End: 2023-03-10

## 2023-03-13 LAB
DEPRECATED KAPPA LC FREE/LAMBDA SER: 1.32 RATIO
FERRITIN SERPL-MCNC: 284 NG/ML
IRON SATN MFR SERPL: 29 %
IRON SERPL-MCNC: 96 UG/DL
KAPPA LC CSF-MCNC: 0.97 MG/DL
KAPPA LC SERPL-MCNC: 1.28 MG/DL
TIBC SERPL-MCNC: 328 UG/DL
TRANSFERRIN SERPL-MCNC: 274 MG/DL
UIBC SERPL-MCNC: 232 UG/DL
VIT B1 SERPL-MCNC: 140.6 NMOL/L
VIT B6 SERPL-MCNC: 44.9 UG/L

## 2023-03-16 LAB
ALBUMIN MFR SERPL ELPH: 64.5 %
ALBUMIN SERPL-MCNC: 4.3 G/DL
ALBUMIN/GLOB SERPL: 1.8 RATIO
ALPHA1 GLOB MFR SERPL ELPH: 3.4 %
ALPHA1 GLOB SERPL ELPH-MCNC: 0.2 G/DL
ALPHA2 GLOB MFR SERPL ELPH: 8.4 %
ALPHA2 GLOB SERPL ELPH-MCNC: 0.6 G/DL
B-GLOBULIN MFR SERPL ELPH: 10.4 %
B-GLOBULIN SERPL ELPH-MCNC: 0.7 G/DL
GAMMA GLOB FLD ELPH-MCNC: 0.9 G/DL
GAMMA GLOB MFR SERPL ELPH: 13.3 %
INTERPRETATION SERPL IEP-IMP: NORMAL
PROT SERPL-MCNC: 6.7 G/DL
PROT SERPL-MCNC: 6.7 G/DL

## 2023-03-31 ENCOUNTER — NON-APPOINTMENT (OUTPATIENT)
Age: 83
End: 2023-03-31

## 2023-03-31 LAB
APPEARANCE: CLEAR
BILIRUBIN URINE: NEGATIVE
BLOOD URINE: NEGATIVE
COLOR: NORMAL
GLUCOSE QUALITATIVE U: NEGATIVE
KETONES URINE: NEGATIVE
LEUKOCYTE ESTERASE URINE: NEGATIVE
NITRITE URINE: NEGATIVE
PH URINE: 7.5
PROTEIN URINE: NEGATIVE
SPECIFIC GRAVITY URINE: 1.01
UROBILINOGEN URINE: NORMAL

## 2023-04-01 LAB — BACTERIA UR CULT: NORMAL

## 2023-04-07 ENCOUNTER — APPOINTMENT (OUTPATIENT)
Dept: FAMILY MEDICINE | Facility: CLINIC | Age: 83
End: 2023-04-07
Payer: MEDICARE

## 2023-04-07 ENCOUNTER — NON-APPOINTMENT (OUTPATIENT)
Age: 83
End: 2023-04-07

## 2023-04-07 VITALS
WEIGHT: 109 LBS | RESPIRATION RATE: 16 BRPM | TEMPERATURE: 98.8 F | SYSTOLIC BLOOD PRESSURE: 126 MMHG | BODY MASS INDEX: 18.61 KG/M2 | DIASTOLIC BLOOD PRESSURE: 67 MMHG | HEART RATE: 75 BPM | HEIGHT: 64 IN | OXYGEN SATURATION: 98 %

## 2023-04-07 DIAGNOSIS — R32 UNSPECIFIED URINARY INCONTINENCE: ICD-10-CM

## 2023-04-07 DIAGNOSIS — N84.2 POLYP OF VAGINA: ICD-10-CM

## 2023-04-07 DIAGNOSIS — R93.89 ABNORMAL FINDINGS ON DIAGNOSTIC IMAGING OF OTHER SPECIFIED BODY STRUCTURES: ICD-10-CM

## 2023-04-07 PROCEDURE — 99214 OFFICE O/P EST MOD 30 MIN: CPT

## 2023-04-07 NOTE — PHYSICAL EXAM
[No Acute Distress] : no acute distress [Well Nourished] : well nourished [Well-Appearing] : well-appearing [Well Developed] : well developed [Normal Voice/Communication] : normal voice/communication [Normal Oropharynx] : the oropharynx was normal [Supple] : supple [No Respiratory Distress] : no respiratory distress  [No Accessory Muscle Use] : no accessory muscle use [Clear to Auscultation] : lungs were clear to auscultation bilaterally [Normal Rate] : normal rate  [Regular Rhythm] : with a regular rhythm [Normal S1, S2] : normal S1 and S2 [Urethral Meatus] : normal urethra [External Female Genitalia] : normal external genitalia [Urinary Bladder Findings] : the bladder was normal on palpation [Cervix] : normal cervix [Anus Abnormality] : the anus and perineum were normal [Speech Grossly Normal] : speech grossly normal [Memory Grossly Normal] : memory grossly normal [Alert and Oriented x3] : oriented to person, place, and time [Normal Affect] : the affect was normal [Normal Mood] : the mood was normal [Normal Insight/Judgement] : insight and judgment were intact [FreeTextEntry1] : atrophic vagina, cervical os small. +?vaginal polyp, friable (difficult exam, needed smaller speculum)

## 2023-04-07 NOTE — ASSESSMENT
[FreeTextEntry1] : gyn referral Dr. Moctezuma\par urology referral Dr. Sandhu\par h/o thickened endometrium; US transvag/pelvic ordered\par RTO for CPE 1 month and f/u

## 2023-04-07 NOTE — HISTORY OF PRESENT ILLNESS
[FreeTextEntry1] : Presents c/o 3-4 burning sensation vaginal area, coconut oil helped. Feels a 'ridge' on the labia. Also c/o urinary incontinence. Had used estrogen cream daily x few months prescribed by urologist. Now recently stopped estrogen cream. \par \par Last mammogram last year normal

## 2023-04-13 ENCOUNTER — APPOINTMENT (OUTPATIENT)
Dept: ULTRASOUND IMAGING | Facility: CLINIC | Age: 83
End: 2023-04-13
Payer: MEDICARE

## 2023-04-13 ENCOUNTER — NON-APPOINTMENT (OUTPATIENT)
Age: 83
End: 2023-04-13

## 2023-04-13 PROCEDURE — 76856 US EXAM PELVIC COMPLETE: CPT

## 2023-04-13 PROCEDURE — 76830 TRANSVAGINAL US NON-OB: CPT

## 2023-04-19 RX ORDER — ALPRAZOLAM 0.5 MG/1
0.5 TABLET ORAL
Qty: 60 | Refills: 0 | Status: COMPLETED | COMMUNITY
Start: 2023-03-13 | End: 2023-04-19

## 2023-04-27 ENCOUNTER — APPOINTMENT (OUTPATIENT)
Dept: NEUROLOGY | Facility: CLINIC | Age: 83
End: 2023-04-27
Payer: MEDICARE

## 2023-04-27 VITALS
HEIGHT: 64 IN | WEIGHT: 109 LBS | DIASTOLIC BLOOD PRESSURE: 67 MMHG | HEART RATE: 75 BPM | SYSTOLIC BLOOD PRESSURE: 126 MMHG | BODY MASS INDEX: 18.61 KG/M2

## 2023-04-27 DIAGNOSIS — G60.8 OTHER HEREDITARY AND IDIOPATHIC NEUROPATHIES: ICD-10-CM

## 2023-04-27 PROCEDURE — 99214 OFFICE O/P EST MOD 30 MIN: CPT

## 2023-04-27 NOTE — HISTORY OF PRESENT ILLNESS
[FreeTextEntry1] : This patient is seen for an office visit.  Her cervical condition remains improved with regular physical therapy.  She also has treatment for her low back condition.  She describes her fingers feeling like sandpaper.  She has paresthesia and a burning sensation in both feet.  She no longer complains of paresthesia involving the posterior scalp.  She has some chronic neck discomfort.  Cervical MRI on 6/27/2022 did reveal spondylosis kyphosis and a moderate to severe central canal stenosis C5-6 and C6-7 but there was normal spinal cord signal.  She denies any motor weakness.  She continues to paint.  She denies change in her gait or balance.

## 2023-04-27 NOTE — PHYSICAL EXAM
[FreeTextEntry1] : Head:  Normocephalic Neck: Mild decreased range of motion.\par \par Mental Status:  Alert Oriented X3 Speech normal and no aphasia or dysarthria.\par \par Cranial Nerves:  PERRL,  Visual Fields full  EOMI no diplopia no ptosis no nystagmus, V through XII intact.\par \par Motor:  No drift, normal strength tone and coordination and no focal atrophy. No abnormal movements. No dysmetria.  Normal rapid alternating movements. \par \par DTRs: Symmetric and 2+.  Plantars flexor.  No Clonus.\par \par Sensory: Reduced vibration and pin distally in the lowers basically unchanged.\par \par Gait: Regular able to tandem walk spinal deformity negative Romberg.\par

## 2023-04-27 NOTE — ASSESSMENT
[FreeTextEntry1] : Impression: This 83-year-old female patient has cervical spondylosis and areas of moderate to severe spinal stenosis and she has a chronic cervical pain condition.  Neurological exam is without findings of myeloradiculopathy in this setting.  She has shown improvement which is ongoing with physical therapy.  Her ongoing sensory complaints as noted may be unrelated to her cervical spine condition and could be due to an unrelated sensory neuropathy.\par \par Recommendations: Continue conservative management and physical therapy.  Office follow-up in 6 months.\par

## 2023-05-16 ENCOUNTER — APPOINTMENT (OUTPATIENT)
Dept: OBGYN | Facility: CLINIC | Age: 83
End: 2023-05-16
Payer: MEDICARE

## 2023-05-16 ENCOUNTER — NON-APPOINTMENT (OUTPATIENT)
Age: 83
End: 2023-05-16

## 2023-05-16 VITALS
HEART RATE: 67 BPM | WEIGHT: 109 LBS | OXYGEN SATURATION: 98 % | BODY MASS INDEX: 18.61 KG/M2 | HEIGHT: 64 IN | DIASTOLIC BLOOD PRESSURE: 74 MMHG | TEMPERATURE: 97.1 F | SYSTOLIC BLOOD PRESSURE: 112 MMHG

## 2023-05-16 DIAGNOSIS — Z01.419 ENCOUNTER FOR GYNECOLOGICAL EXAMINATION (GENERAL) (ROUTINE) W/OUT ABNORMAL FINDINGS: ICD-10-CM

## 2023-05-16 DIAGNOSIS — R92.2 INCONCLUSIVE MAMMOGRAM: ICD-10-CM

## 2023-05-16 PROCEDURE — G0101: CPT

## 2023-05-16 RX ORDER — DULOXETINE HYDROCHLORIDE 20 MG/1
20 CAPSULE, DELAYED RELEASE PELLETS ORAL
Qty: 30 | Refills: 1 | Status: DISCONTINUED | COMMUNITY
Start: 2023-03-07 | End: 2023-05-16

## 2023-05-16 NOTE — PLAN
[FreeTextEntry1] : \par \par I spent the time noted on the day of this patient encounter preparing for, providing and documenting the above service. I have  counseled and educated the patient on the differential, workup, disease course, and treatment/management plan. Education was provided to the patient during this encounter. All questions and concerns were answered and addressed in detail.\par \par Nata Moctezuma MD\par

## 2023-05-16 NOTE — HISTORY OF PRESENT ILLNESS
[FreeTextEntry1] : 83  presents today for well woman exam. Referred by Dr. Paniagua for polyp--Pt aware that she has a urethral caruncle--polyp is not cervical in origin--she has a 2cm urethral polyp noted on exam. Asymptomatic\par \par LMP: 50s\par \par POB: CSx2\par PGYN: , denies abl pap\par PMH: anxiety\par PSH: csx2\par Med: xanax\par All: sulfa\par SH: denies\par \par \par Recent TVUS in April 2023--EE wnl

## 2023-05-16 NOTE — PHYSICAL EXAM
[Chaperone Present] : A chaperone was present in the examining room during all aspects of the physical examination [Appropriately responsive] : appropriately responsive [Alert] : alert [No Acute Distress] : no acute distress [Oriented x3] : oriented x3 [Examination Of The Breasts] : a normal appearance [No Masses] : no breast masses were palpable [Labia Majora] : normal [Labia Minora] : normal [Urethral Caruncle] : urethral caruncle [Normal] : normal [Uterine Adnexae] : normal

## 2023-05-18 ENCOUNTER — NON-APPOINTMENT (OUTPATIENT)
Age: 83
End: 2023-05-18

## 2023-05-19 ENCOUNTER — APPOINTMENT (OUTPATIENT)
Dept: FAMILY MEDICINE | Facility: CLINIC | Age: 83
End: 2023-05-19

## 2023-06-12 NOTE — ED ADULT NURSE NOTE - PRO INTERPRETER NEED 2
English Wartpeel Counseling:  I discussed with the patient the risks of Wartpeel including but not limited to erythema, scaling, itching, weeping, crusting, and pain.

## 2023-06-14 ENCOUNTER — APPOINTMENT (OUTPATIENT)
Dept: MAMMOGRAPHY | Facility: HOSPITAL | Age: 83
End: 2023-06-14

## 2023-06-14 ENCOUNTER — APPOINTMENT (OUTPATIENT)
Dept: ULTRASOUND IMAGING | Facility: HOSPITAL | Age: 83
End: 2023-06-14

## 2023-06-15 ENCOUNTER — APPOINTMENT (OUTPATIENT)
Dept: FAMILY MEDICINE | Facility: CLINIC | Age: 83
End: 2023-06-15
Payer: MEDICARE

## 2023-06-15 VITALS
BODY MASS INDEX: 18.61 KG/M2 | DIASTOLIC BLOOD PRESSURE: 64 MMHG | SYSTOLIC BLOOD PRESSURE: 119 MMHG | WEIGHT: 109 LBS | HEIGHT: 64 IN | RESPIRATION RATE: 16 BRPM | TEMPERATURE: 98.6 F | OXYGEN SATURATION: 97 % | HEART RATE: 68 BPM

## 2023-06-15 DIAGNOSIS — Z87.898 PERSONAL HISTORY OF OTHER SPECIFIED CONDITIONS: ICD-10-CM

## 2023-06-15 DIAGNOSIS — H04.129 DRY EYE SYNDROME OF UNSPECIFIED LACRIMAL GLAND: ICD-10-CM

## 2023-06-15 PROCEDURE — G0439: CPT

## 2023-06-15 RX ORDER — CYCLOSPORINE 0.5 MG/ML
0.05 EMULSION OPHTHALMIC
Refills: 0 | Status: ACTIVE | COMMUNITY
Start: 2023-06-15

## 2023-06-15 NOTE — HISTORY OF PRESENT ILLNESS
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 for which she has seen JACKIE Schneider, urethral caruncle who presents for AWV. She is feeling well today. She is currently on alprazolam for sleep. She endorses some left shoulder pain that she had in the past but was doing repetitive arm exercises and believes she aggravated it. \par \par Eye doctor: Dr. Adam \par Dentist up to date

## 2023-06-15 NOTE — REVIEW OF SYSTEMS
[Joint Pain] : joint pain [Negative] : Respiratory [FreeTextEntry7] : IBS, now with more constipation  [FreeTextEntry9] : shoulder pain left

## 2023-06-15 NOTE — ASSESSMENT
[FreeTextEntry1] : RTO 3 months, will need CBC at that time \par Patient to request vaccine record from prior PCP\par

## 2023-06-15 NOTE — HEALTH RISK ASSESSMENT
[Yes] : Yes [Monthly or less (1 pt)] : Monthly or less (1 point) [1 or 2 (0 pts)] : 1 or 2 (0 points) [Never (0 pts)] : Never (0 points) [Fully functional (bathing, dressing, toileting, transferring, walking, feeding)] : Fully functional (bathing, dressing, toileting, transferring, walking, feeding) [Fully functional (using the telephone, shopping, preparing meals, housekeeping, doing laundry, using] : Fully functional and needs no help or supervision to perform IADLs (using the telephone, shopping, preparing meals, housekeeping, doing laundry, using transportation, managing medications and managing finances) [With Patient/Caregiver] : , with patient/caregiver [Reviewed no changes] : Reviewed, no changes [Designated Healthcare Proxy] : Designated healthcare proxy [Name: ___] : Health Care Proxy's Name: [unfilled]  [Relationship: ___] : Relationship: [unfilled] [Good] : ~his/her~ current health as good [No falls in past year] : Patient reported no falls in the past year [Patient reported mammogram was normal] : Patient reported mammogram was normal [With Significant Other] : lives with significant other [Patient reported bone density results were abnormal] : Patient reported bone density results were abnormal [Retired] : retired [] :  [Reports changes in hearing] : Reports no changes in hearing [Reports changes in vision] : Reports no changes in vision [Reports changes in dental health] : Reports no changes in dental health [MammogramDate] : 2022 [BoneDensityDate] : 2021 [BoneDensityComments] : osteoporosis [AdvancecareDate] : 06/2023

## 2023-06-15 NOTE — PHYSICAL EXAM
[Coordination Grossly Intact] : coordination grossly intact [No Focal Deficits] : no focal deficits [Normal] : affect was normal and insight and judgment were intact [de-identified] : wearing glasses

## 2023-06-16 ENCOUNTER — APPOINTMENT (OUTPATIENT)
Dept: UROLOGY | Facility: CLINIC | Age: 83
End: 2023-06-16
Payer: MEDICARE

## 2023-06-16 VITALS
TEMPERATURE: 97.5 F | RESPIRATION RATE: 16 BRPM | BODY MASS INDEX: 18.78 KG/M2 | HEIGHT: 64 IN | SYSTOLIC BLOOD PRESSURE: 120 MMHG | HEART RATE: 61 BPM | OXYGEN SATURATION: 100 % | DIASTOLIC BLOOD PRESSURE: 74 MMHG | WEIGHT: 110 LBS

## 2023-06-16 PROCEDURE — 99203 OFFICE O/P NEW LOW 30 MIN: CPT

## 2023-06-20 ENCOUNTER — TRANSCRIPTION ENCOUNTER (OUTPATIENT)
Age: 83
End: 2023-06-20

## 2023-06-20 NOTE — PHYSICAL EXAM
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

## 2023-06-20 NOTE — ASSESSMENT
[FreeTextEntry1] : Mildly symptomatic urethral prolapse\par --estrace 3x per week\par --sitz baths\par --RTC in 6mo

## 2023-06-20 NOTE — HISTORY OF PRESENT ILLNESS
[FreeTextEntry1] : 84yo female with cc of urethral caruncle. Pt reports that about 5y ago she was told she had blood in the urine. She was sent to urologist and had eval and was told she had caruncle. No interventions done. She started to have a little bother and was placed on estrace. She also has hx of cdiff from a prior tx with UTI. She recently saw GYN and was told perhaps to consider surgical options. She has been using estrace 2x per week over the last month or so. Was using intermittently in the past from this episode. \par \par No bleeding from the caruncle. Has occasional dribbling. SHe gets a vaginal itching/burning discomfort. Also reports a labial irritation.

## 2023-07-07 ENCOUNTER — TRANSCRIPTION ENCOUNTER (OUTPATIENT)
Age: 83
End: 2023-07-07

## 2023-07-12 ENCOUNTER — TRANSCRIPTION ENCOUNTER (OUTPATIENT)
Age: 83
End: 2023-07-12

## 2023-07-17 ENCOUNTER — TRANSCRIPTION ENCOUNTER (OUTPATIENT)
Age: 83
End: 2023-07-17

## 2023-07-31 ENCOUNTER — APPOINTMENT (OUTPATIENT)
Dept: OBGYN | Facility: CLINIC | Age: 83
End: 2023-07-31

## 2023-08-01 ENCOUNTER — APPOINTMENT (OUTPATIENT)
Dept: UROGYNECOLOGY | Facility: CLINIC | Age: 83
End: 2023-08-01
Payer: MEDICARE

## 2023-08-01 DIAGNOSIS — Z81.8 FAMILY HISTORY OF OTHER MENTAL AND BEHAVIORAL DISORDERS: ICD-10-CM

## 2023-08-01 DIAGNOSIS — R35.0 FREQUENCY OF MICTURITION: ICD-10-CM

## 2023-08-01 DIAGNOSIS — R39.15 URGENCY OF URINATION: ICD-10-CM

## 2023-08-01 DIAGNOSIS — Z78.9 OTHER SPECIFIED HEALTH STATUS: ICD-10-CM

## 2023-08-01 DIAGNOSIS — N36.8 OTHER SPECIFIED DISORDERS OF URETHRA: ICD-10-CM

## 2023-08-01 DIAGNOSIS — R33.9 RETENTION OF URINE, UNSPECIFIED: ICD-10-CM

## 2023-08-01 PROCEDURE — 99214 OFFICE O/P EST MOD 30 MIN: CPT | Mod: 25

## 2023-08-01 PROCEDURE — 51701 INSERT BLADDER CATHETER: CPT

## 2023-08-01 RX ORDER — ESTRADIOL 0.1 MG/G
0.1 CREAM VAGINAL
Qty: 1 | Refills: 3 | Status: ACTIVE | COMMUNITY
Start: 2023-08-01 | End: 1900-01-01

## 2023-08-01 NOTE — DISCUSSION/SUMMARY
[FreeTextEntry1] : Severe atrophy, urethral prolapse: -She has been using vaginal estrogen externally with only pea sized amount. Recommend she start using it 1 g intravaginally QHS x 2 weeks then biw. Instructions provided. r/b/a reviewed  Vulvar lichenification:  -Start clobetasol taper -Follow up in 2-3 months for repeat exam Frequency, urgency, sensation of incomplete emptying: -PVR normal -Start behavioral and fluid modifications -Bladder training  RTO in 3 mo for follow up, or sooner if issues arise

## 2023-08-01 NOTE — HISTORY OF PRESENT ILLNESS
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 She reports rare MELE, not bothersome. She denies urgency incontinence. She reports urinary frequency and urgency. She voids multiple times a night.  She does notice incomplete bladder emptying-which improves if she waits for her bladder to be more full when she attempts to void. She reports vulvar pain and pruritis. She does report a h/o lichen sclerosis. She is using vaginal estrogen for atrophy however denies use of topical steroid. She is not sexually active.  PMH: Anxiety PSH:  x 2  Daily fluid intake: 3 c tea, 3 c water, occasional seltzer

## 2023-08-01 NOTE — PHYSICAL EXAM
[Chaperone Present] : A chaperone was present in the examining room during all aspects of the physical examination [FreeTextEntry1] : Gen: NAD Abd: Soft, NT, ND Vulvar: left majora erythema and scaling, no ulcerations.  Vagina: severe atrophy Urethra: mucosal prolapse present Bladder: no masses, nontender Cervix: normal Uterus: Normal

## 2023-08-01 NOTE — REASON FOR VISIT
[Questionnaire Received] : Patient questionnaire received [Intake Form Reviewed] : Patient intake form with past medical history, surgical history, family history and social history reviewed today [________] : [unfilled] [Urine Frequency] : urine frequency [Urinary Urgency] : urinary urgency

## 2023-08-02 PROBLEM — Z78.9 RARELY CONSUMES ALCOHOL: Status: ACTIVE | Noted: 2023-08-02

## 2023-08-02 PROBLEM — Z81.8 FAMILY HISTORY OF DEPRESSION: Status: ACTIVE | Noted: 2023-08-02

## 2023-08-02 LAB
APPEARANCE: CLEAR
BACTERIA: NEGATIVE /HPF
BILIRUBIN URINE: NEGATIVE
BLOOD URINE: NEGATIVE
CAST: 0 /LPF
COLOR: YELLOW
EPITHELIAL CELLS: 0 /HPF
GLUCOSE QUALITATIVE U: NEGATIVE MG/DL
KETONES URINE: NEGATIVE MG/DL
LEUKOCYTE ESTERASE URINE: NEGATIVE
MICROSCOPIC-UA: NORMAL
NITRITE URINE: NEGATIVE
PH URINE: 7.5
PROTEIN URINE: NEGATIVE MG/DL
RED BLOOD CELLS URINE: 0 /HPF
SPECIFIC GRAVITY URINE: 1.01
UROBILINOGEN URINE: 0.2 MG/DL
WHITE BLOOD CELLS URINE: 0 /HPF

## 2023-08-03 LAB — BACTERIA UR CULT: NORMAL

## 2023-08-07 ENCOUNTER — TRANSCRIPTION ENCOUNTER (OUTPATIENT)
Age: 83
End: 2023-08-07

## 2023-08-08 ENCOUNTER — APPOINTMENT (OUTPATIENT)
Dept: OBGYN | Facility: CLINIC | Age: 83
End: 2023-08-08

## 2023-08-15 ENCOUNTER — OUTPATIENT (OUTPATIENT)
Dept: OUTPATIENT SERVICES | Facility: HOSPITAL | Age: 83
LOS: 1 days | End: 2023-08-15
Payer: MEDICARE

## 2023-08-15 ENCOUNTER — APPOINTMENT (OUTPATIENT)
Dept: MRI IMAGING | Facility: HOSPITAL | Age: 83
End: 2023-08-15
Payer: MEDICARE

## 2023-08-15 DIAGNOSIS — Z98.890 OTHER SPECIFIED POSTPROCEDURAL STATES: Chronic | ICD-10-CM

## 2023-08-15 DIAGNOSIS — Z00.8 ENCOUNTER FOR OTHER GENERAL EXAMINATION: ICD-10-CM

## 2023-08-15 PROCEDURE — 73221 MRI JOINT UPR EXTREM W/O DYE: CPT | Mod: MH

## 2023-08-15 PROCEDURE — 73221 MRI JOINT UPR EXTREM W/O DYE: CPT | Mod: 26,RT,MH

## 2023-09-10 LAB
BASOPHILS # BLD AUTO: 0.1 K/UL
BASOPHILS NFR BLD AUTO: 1.6 %
EOSINOPHIL # BLD AUTO: 0.16 K/UL
EOSINOPHIL NFR BLD AUTO: 2.6 %
HCT VFR BLD CALC: 34.3 %
HGB BLD-MCNC: 11.1 G/DL
IMM GRANULOCYTES NFR BLD AUTO: 0.3 %
LYMPHOCYTES # BLD AUTO: 2.16 K/UL
LYMPHOCYTES NFR BLD AUTO: 34.5 %
MAN DIFF?: NORMAL
MCHC RBC-ENTMCNC: 32.4 GM/DL
MCHC RBC-ENTMCNC: 33.8 PG
MCV RBC AUTO: 104.6 FL
MONOCYTES # BLD AUTO: 0.64 K/UL
MONOCYTES NFR BLD AUTO: 10.2 %
NEUTROPHILS # BLD AUTO: 3.18 K/UL
NEUTROPHILS NFR BLD AUTO: 50.8 %
PLATELET # BLD AUTO: 205 K/UL
RBC # BLD: 3.28 M/UL
RBC # FLD: 14.8 %
WBC # FLD AUTO: 6.26 K/UL

## 2023-09-13 ENCOUNTER — APPOINTMENT (OUTPATIENT)
Dept: FAMILY MEDICINE | Facility: CLINIC | Age: 83
End: 2023-09-13
Payer: MEDICARE

## 2023-09-13 VITALS
HEIGHT: 64 IN | OXYGEN SATURATION: 99 % | DIASTOLIC BLOOD PRESSURE: 61 MMHG | BODY MASS INDEX: 18.78 KG/M2 | RESPIRATION RATE: 16 BRPM | TEMPERATURE: 98.7 F | SYSTOLIC BLOOD PRESSURE: 124 MMHG | HEART RATE: 64 BPM | WEIGHT: 110 LBS

## 2023-09-13 PROCEDURE — 99214 OFFICE O/P EST MOD 30 MIN: CPT

## 2023-09-27 ENCOUNTER — NON-APPOINTMENT (OUTPATIENT)
Age: 83
End: 2023-09-27

## 2023-09-27 ENCOUNTER — APPOINTMENT (OUTPATIENT)
Dept: CARDIOLOGY | Facility: CLINIC | Age: 83
End: 2023-09-27
Payer: MEDICARE

## 2023-09-27 VITALS
HEART RATE: 61 BPM | OXYGEN SATURATION: 97 % | BODY MASS INDEX: 19.29 KG/M2 | DIASTOLIC BLOOD PRESSURE: 67 MMHG | WEIGHT: 113 LBS | HEIGHT: 64 IN | SYSTOLIC BLOOD PRESSURE: 109 MMHG

## 2023-09-27 PROCEDURE — 99204 OFFICE O/P NEW MOD 45 MIN: CPT

## 2023-09-27 PROCEDURE — 93000 ELECTROCARDIOGRAM COMPLETE: CPT

## 2023-09-27 PROCEDURE — 93306 TTE W/DOPPLER COMPLETE: CPT

## 2023-09-30 ENCOUNTER — TRANSCRIPTION ENCOUNTER (OUTPATIENT)
Age: 83
End: 2023-09-30

## 2023-09-30 DIAGNOSIS — D46.9 MYELODYSPLASTIC SYNDROME, UNSPECIFIED: ICD-10-CM

## 2023-10-02 ENCOUNTER — NON-APPOINTMENT (OUTPATIENT)
Age: 83
End: 2023-10-02

## 2023-10-04 ENCOUNTER — APPOINTMENT (OUTPATIENT)
Dept: CARDIOLOGY | Facility: CLINIC | Age: 83
End: 2023-10-04
Payer: MEDICARE

## 2023-10-04 ENCOUNTER — NON-APPOINTMENT (OUTPATIENT)
Age: 83
End: 2023-10-04

## 2023-10-04 VITALS
HEIGHT: 64 IN | HEART RATE: 88 BPM | BODY MASS INDEX: 19.29 KG/M2 | DIASTOLIC BLOOD PRESSURE: 69 MMHG | SYSTOLIC BLOOD PRESSURE: 117 MMHG | WEIGHT: 113 LBS

## 2023-10-04 PROCEDURE — 99214 OFFICE O/P EST MOD 30 MIN: CPT

## 2023-10-04 PROCEDURE — 93224 XTRNL ECG REC UP TO 48 HRS: CPT

## 2023-10-13 ENCOUNTER — APPOINTMENT (OUTPATIENT)
Dept: CARDIOLOGY | Facility: CLINIC | Age: 83
End: 2023-10-13
Payer: MEDICARE

## 2023-10-13 VITALS
SYSTOLIC BLOOD PRESSURE: 150 MMHG | HEIGHT: 64 IN | OXYGEN SATURATION: 95 % | DIASTOLIC BLOOD PRESSURE: 83 MMHG | WEIGHT: 113 LBS | HEART RATE: 65 BPM | BODY MASS INDEX: 19.29 KG/M2

## 2023-10-13 PROCEDURE — 99212 OFFICE O/P EST SF 10 MIN: CPT

## 2023-10-13 PROCEDURE — 93000 ELECTROCARDIOGRAM COMPLETE: CPT

## 2023-10-30 ENCOUNTER — APPOINTMENT (OUTPATIENT)
Dept: NEUROLOGY | Facility: CLINIC | Age: 83
End: 2023-10-30
Payer: MEDICARE

## 2023-10-30 VITALS
DIASTOLIC BLOOD PRESSURE: 84 MMHG | TEMPERATURE: 98.2 F | WEIGHT: 113 LBS | HEIGHT: 65 IN | HEART RATE: 65 BPM | BODY MASS INDEX: 18.83 KG/M2 | SYSTOLIC BLOOD PRESSURE: 138 MMHG | OXYGEN SATURATION: 98 %

## 2023-10-30 DIAGNOSIS — M47.816 SPONDYLOSIS W/OUT MYELOPATHY OR RADICULOPATHY, LUMBAR REGION: ICD-10-CM

## 2023-10-30 DIAGNOSIS — M47.812 SPONDYLOSIS W/OUT MYELOPATHY OR RADICULOPATHY, CERVICAL REGION: ICD-10-CM

## 2023-10-30 DIAGNOSIS — R20.2 PARESTHESIA OF SKIN: ICD-10-CM

## 2023-10-30 PROCEDURE — 99214 OFFICE O/P EST MOD 30 MIN: CPT

## 2023-10-30 RX ORDER — ESTRADIOL 0.1 MG/G
0.1 CREAM VAGINAL
Qty: 1 | Refills: 5 | Status: COMPLETED | COMMUNITY
Start: 2023-06-16 | End: 2023-10-30

## 2023-10-30 RX ORDER — SODIUM FLUORIDE 1.1 G/100G
1.1 GEL, DENTIFRICE ORAL
Qty: 102 | Refills: 0 | Status: ACTIVE | COMMUNITY
Start: 2023-10-24

## 2023-11-02 ENCOUNTER — TRANSCRIPTION ENCOUNTER (OUTPATIENT)
Age: 83
End: 2023-11-02

## 2023-11-03 ENCOUNTER — NON-APPOINTMENT (OUTPATIENT)
Age: 83
End: 2023-11-03

## 2023-11-03 ENCOUNTER — APPOINTMENT (OUTPATIENT)
Dept: ELECTROPHYSIOLOGY | Facility: CLINIC | Age: 83
End: 2023-11-03
Payer: MEDICARE

## 2023-11-03 VITALS
SYSTOLIC BLOOD PRESSURE: 108 MMHG | WEIGHT: 113 LBS | BODY MASS INDEX: 18.83 KG/M2 | OXYGEN SATURATION: 100 % | DIASTOLIC BLOOD PRESSURE: 65 MMHG | RESPIRATION RATE: 14 BRPM | HEART RATE: 56 BPM | HEIGHT: 65 IN

## 2023-11-03 PROCEDURE — 99214 OFFICE O/P EST MOD 30 MIN: CPT

## 2023-11-03 PROCEDURE — 93000 ELECTROCARDIOGRAM COMPLETE: CPT

## 2023-11-03 RX ORDER — ALPRAZOLAM 0.25 MG/1
0.25 TABLET ORAL
Qty: 60 | Refills: 0 | Status: DISCONTINUED | COMMUNITY
Start: 2023-04-19 | End: 2023-11-03

## 2023-11-08 ENCOUNTER — APPOINTMENT (OUTPATIENT)
Dept: UROGYNECOLOGY | Facility: CLINIC | Age: 83
End: 2023-11-08
Payer: MEDICARE

## 2023-11-08 VITALS
HEIGHT: 65 IN | SYSTOLIC BLOOD PRESSURE: 126 MMHG | DIASTOLIC BLOOD PRESSURE: 75 MMHG | WEIGHT: 113 LBS | HEART RATE: 69 BPM | BODY MASS INDEX: 18.83 KG/M2

## 2023-11-08 DIAGNOSIS — L29.2 PRURITUS VULVAE: ICD-10-CM

## 2023-11-08 DIAGNOSIS — N95.2 POSTMENOPAUSAL ATROPHIC VAGINITIS: ICD-10-CM

## 2023-11-08 PROCEDURE — 99214 OFFICE O/P EST MOD 30 MIN: CPT

## 2023-11-13 PROBLEM — L29.2 VULVAR PRURITUS: Status: ACTIVE | Noted: 2023-08-01

## 2023-11-13 PROBLEM — N95.2 ATROPHIC VAGINITIS: Status: ACTIVE | Noted: 2023-08-01

## 2023-12-15 ENCOUNTER — APPOINTMENT (OUTPATIENT)
Dept: UROLOGY | Facility: CLINIC | Age: 83
End: 2023-12-15

## 2023-12-18 ENCOUNTER — APPOINTMENT (OUTPATIENT)
Dept: FAMILY MEDICINE | Facility: CLINIC | Age: 83
End: 2023-12-18
Payer: MEDICARE

## 2023-12-18 VITALS
HEART RATE: 71 BPM | WEIGHT: 109 LBS | RESPIRATION RATE: 14 BRPM | TEMPERATURE: 98.7 F | DIASTOLIC BLOOD PRESSURE: 76 MMHG | OXYGEN SATURATION: 98 % | BODY MASS INDEX: 18.14 KG/M2 | SYSTOLIC BLOOD PRESSURE: 136 MMHG

## 2023-12-18 DIAGNOSIS — Z86.19 PERSONAL HISTORY OF OTHER INFECTIOUS AND PARASITIC DISEASES: ICD-10-CM

## 2023-12-18 DIAGNOSIS — Z87.19 PERSONAL HISTORY OF OTHER DISEASES OF THE DIGESTIVE SYSTEM: ICD-10-CM

## 2023-12-18 PROCEDURE — 99213 OFFICE O/P EST LOW 20 MIN: CPT

## 2023-12-19 PROBLEM — Z87.19 HISTORY OF IRRITABLE BOWEL SYNDROME: Status: RESOLVED | Noted: 2023-08-02 | Resolved: 2023-12-19

## 2023-12-19 PROBLEM — Z86.19 HISTORY OF CLOSTRIDIOIDES DIFFICILE COLITIS: Status: RESOLVED | Noted: 2023-12-19 | Resolved: 2023-12-19

## 2023-12-19 NOTE — HISTORY OF PRESENT ILLNESS
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 Diff, osteoporosis, atrial tachycardia, dry eyes, chronic rhinitis, spondylolisthesis C5-7, lower back radiculopathy, presents for follow up and to formally establish care with new PCP. She is feeling well today. No concerns. Here to review medical conditions and has a few questions.   Hematology - Dr. Gomez Neurology - Dr. Oneil Cardiology- Dr. Pulido EP Dr. Celis Uro Gyn- Dr. Treadwell ID - Dr. Cross

## 2023-12-19 NOTE — PHYSICAL EXAM
[No Acute Distress] : no acute distress [Well Nourished] : well nourished [Well Developed] : well developed [Well-Appearing] : well-appearing [Normal Voice/Communication] : normal voice/communication [Normal Sclera/Conjunctiva] : normal sclera/conjunctiva [Normal Outer Ear/Nose] : the outer ears and nose were normal in appearance [Supple] : supple [No Respiratory Distress] : no respiratory distress  [No Accessory Muscle Use] : no accessory muscle use [Clear to Auscultation] : lungs were clear to auscultation bilaterally [Normal Rate] : normal rate  [Regular Rhythm] : with a regular rhythm [Normal S1, S2] : normal S1 and S2 [Speech Grossly Normal] : speech grossly normal [Memory Grossly Normal] : memory grossly normal [Normal Affect] : the affect was normal [Alert and Oriented x3] : oriented to person, place, and time [Normal Mood] : the mood was normal [Normal Insight/Judgement] : insight and judgment were intact [Coordination Grossly Intact] : coordination grossly intact [No Focal Deficits] : no focal deficits [de-identified] : wearing glasses

## 2024-01-19 ENCOUNTER — APPOINTMENT (OUTPATIENT)
Dept: CARDIOLOGY | Facility: CLINIC | Age: 84
End: 2024-01-19

## 2024-02-06 ENCOUNTER — APPOINTMENT (OUTPATIENT)
Dept: CARDIOLOGY | Facility: CLINIC | Age: 84
End: 2024-02-06

## 2024-02-27 DIAGNOSIS — E55.9 VITAMIN D DEFICIENCY, UNSPECIFIED: ICD-10-CM

## 2024-02-27 DIAGNOSIS — Z13.220 ENCOUNTER FOR SCREENING FOR LIPOID DISORDERS: ICD-10-CM

## 2024-03-14 ENCOUNTER — APPOINTMENT (OUTPATIENT)
Dept: FAMILY MEDICINE | Facility: CLINIC | Age: 84
End: 2024-03-14
Payer: MEDICARE

## 2024-03-14 VITALS
RESPIRATION RATE: 14 BRPM | SYSTOLIC BLOOD PRESSURE: 105 MMHG | WEIGHT: 113 LBS | BODY MASS INDEX: 18.83 KG/M2 | HEIGHT: 65 IN | TEMPERATURE: 97.7 F | OXYGEN SATURATION: 97 % | HEART RATE: 78 BPM | DIASTOLIC BLOOD PRESSURE: 60 MMHG

## 2024-03-14 DIAGNOSIS — I47.19 OTHER SUPRAVENTRICULAR TACHYCARDIA: ICD-10-CM

## 2024-03-14 DIAGNOSIS — D64.9 ANEMIA, UNSPECIFIED: ICD-10-CM

## 2024-03-14 DIAGNOSIS — G47.00 INSOMNIA, UNSPECIFIED: ICD-10-CM

## 2024-03-14 LAB
25(OH)D3 SERPL-MCNC: 55 NG/ML
ALBUMIN SERPL ELPH-MCNC: 4.5 G/DL
ALP BLD-CCNC: 40 U/L
ALT SERPL-CCNC: 29 U/L
ANION GAP SERPL CALC-SCNC: 10 MMOL/L
AST SERPL-CCNC: 19 U/L
BASOPHILS # BLD AUTO: 0.09 K/UL
BASOPHILS NFR BLD AUTO: 2.1 %
BILIRUB SERPL-MCNC: 0.6 MG/DL
BUN SERPL-MCNC: 19 MG/DL
CALCIUM SERPL-MCNC: 9.6 MG/DL
CHLORIDE SERPL-SCNC: 105 MMOL/L
CHOLEST SERPL-MCNC: 205 MG/DL
CO2 SERPL-SCNC: 26 MMOL/L
CREAT SERPL-MCNC: 0.7 MG/DL
EGFR: 86 ML/MIN/1.73M2
EOSINOPHIL # BLD AUTO: 0.21 K/UL
EOSINOPHIL NFR BLD AUTO: 4.8 %
ESTIMATED AVERAGE GLUCOSE: 120 MG/DL
FERRITIN SERPL-MCNC: 214 NG/ML
GLUCOSE SERPL-MCNC: 88 MG/DL
HBA1C MFR BLD HPLC: 5.8 %
HCT VFR BLD CALC: 34.5 %
HDLC SERPL-MCNC: 97 MG/DL
HGB BLD-MCNC: 11.3 G/DL
IMM GRANULOCYTES NFR BLD AUTO: 0.2 %
IRON SATN MFR SERPL: 46 %
IRON SERPL-MCNC: 148 UG/DL
LDLC SERPL CALC-MCNC: 94 MG/DL
LYMPHOCYTES # BLD AUTO: 1.51 K/UL
LYMPHOCYTES NFR BLD AUTO: 34.8 %
MAN DIFF?: NORMAL
MCHC RBC-ENTMCNC: 32.8 GM/DL
MCHC RBC-ENTMCNC: 33.5 PG
MCV RBC AUTO: 102.4 FL
MONOCYTES # BLD AUTO: 0.5 K/UL
MONOCYTES NFR BLD AUTO: 11.5 %
NEUTROPHILS # BLD AUTO: 2.02 K/UL
NEUTROPHILS NFR BLD AUTO: 46.6 %
NONHDLC SERPL-MCNC: 108 MG/DL
PLATELET # BLD AUTO: 196 K/UL
POTASSIUM SERPL-SCNC: 4.1 MMOL/L
PROT SERPL-MCNC: 6.6 G/DL
RBC # BLD: 3.37 M/UL
RBC # FLD: 15.6 %
SODIUM SERPL-SCNC: 141 MMOL/L
TIBC SERPL-MCNC: 324 UG/DL
TRIGL SERPL-MCNC: 80 MG/DL
TSH SERPL-ACNC: 3.86 UIU/ML
UIBC SERPL-MCNC: 175 UG/DL
WBC # FLD AUTO: 4.34 K/UL

## 2024-03-14 PROCEDURE — 99214 OFFICE O/P EST MOD 30 MIN: CPT

## 2024-03-14 PROCEDURE — G2211 COMPLEX E/M VISIT ADD ON: CPT

## 2024-03-14 NOTE — REVIEW OF SYSTEMS
[Negative] : Constitutional [Palpitations] : palpitations [Insomnia] : insomnia [Chest Pain] : no chest pain

## 2024-03-14 NOTE — HISTORY OF PRESENT ILLNESS
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 Diff, osteoporosis, atrial tachycardia, dry eyes, chronic rhinitis, spondylolisthesis C5-7, lower back radiculopathy who presents for review of bloodwork. She also notes that she is noticing the palpitations that she had been evaluated for the recent past. She would like to make a follow up appointment with Dr. Celis. The patient has been taking Xanax for the past 15 years for sleep aid. She uses 0.25mg dose and sometimes splits or lowers the doses according to her needs. The medication's dependency has been realized and she has contemplated substituting it with a placebo.  Hematology - Dr. Gomez Neurology - Dr. Oneil Cardiology- Dr. Pulido, EP Dr. Celis Uro Gyn- Dr. Treadwell ID - Dr. Cross

## 2024-03-14 NOTE — ASSESSMENT
[FreeTextEntry1] : RTO in June for AWV  I am providing continuous care for this patient that includes testing, discussion of options for treatment, and shared decision making with regard to the chosen treatment.

## 2024-03-14 NOTE — DATA REVIEWED
[FreeTextEntry1] : Laboratory/Data Review: Hemoglobin is stable, electrolytes, kidney function, liver function, cholesterol, thyroid, iron stores and Vitamin D levels are all reported to be normal.

## 2024-03-14 NOTE — PHYSICAL EXAM
[No Acute Distress] : no acute distress [Well Nourished] : well nourished [Well Developed] : well developed [Normal Voice/Communication] : normal voice/communication [Well-Appearing] : well-appearing [No Respiratory Distress] : no respiratory distress  [Coordination Grossly Intact] : coordination grossly intact [No Focal Deficits] : no focal deficits [Speech Grossly Normal] : speech grossly normal [Memory Grossly Normal] : memory grossly normal [Normal Affect] : the affect was normal [Alert and Oriented x3] : oriented to person, place, and time [Normal Mood] : the mood was normal [Normal Insight/Judgement] : insight and judgment were intact [de-identified] : wearing glasses

## 2024-03-19 DIAGNOSIS — Z12.39 ENCOUNTER FOR OTHER SCREENING FOR MALIGNANT NEOPLASM OF BREAST: ICD-10-CM

## 2024-04-11 DIAGNOSIS — Z01.10 ENCOUNTER FOR EXAMINATION OF EARS AND HEARING W/OUT ABNORMAL FINDINGS: ICD-10-CM

## 2024-04-16 RX ORDER — CLOBETASOL PROPIONATE 0.5 MG/G
0.05 OINTMENT TOPICAL
Qty: 1 | Refills: 1 | Status: ACTIVE | COMMUNITY
Start: 2023-08-01 | End: 1900-01-01

## 2024-04-22 ENCOUNTER — TRANSCRIPTION ENCOUNTER (OUTPATIENT)
Age: 84
End: 2024-04-22

## 2024-05-01 DIAGNOSIS — R73.03 PREDIABETES.: ICD-10-CM

## 2024-05-01 DIAGNOSIS — E78.00 PURE HYPERCHOLESTEROLEMIA, UNSPECIFIED: ICD-10-CM

## 2024-05-15 ENCOUNTER — APPOINTMENT (OUTPATIENT)
Dept: ULTRASOUND IMAGING | Facility: HOSPITAL | Age: 84
End: 2024-05-15

## 2024-05-15 ENCOUNTER — APPOINTMENT (OUTPATIENT)
Dept: MAMMOGRAPHY | Facility: HOSPITAL | Age: 84
End: 2024-05-15

## 2024-06-04 ENCOUNTER — APPOINTMENT (OUTPATIENT)
Dept: UROGYNECOLOGY | Facility: CLINIC | Age: 84
End: 2024-06-04
Payer: MEDICARE

## 2024-06-04 VITALS
HEIGHT: 65 IN | SYSTOLIC BLOOD PRESSURE: 137 MMHG | DIASTOLIC BLOOD PRESSURE: 75 MMHG | HEART RATE: 71 BPM | BODY MASS INDEX: 18.83 KG/M2 | WEIGHT: 113 LBS

## 2024-06-04 DIAGNOSIS — N36.2 URETHRAL CARUNCLE: ICD-10-CM

## 2024-06-04 DIAGNOSIS — L90.0 LICHEN SCLEROSUS ET ATROPHICUS: ICD-10-CM

## 2024-06-04 PROCEDURE — 99214 OFFICE O/P EST MOD 30 MIN: CPT

## 2024-06-04 PROCEDURE — 99459 PELVIC EXAMINATION: CPT

## 2024-06-04 RX ORDER — CLOBETASOL PROPIONATE 0.5 MG/G
0.05 OINTMENT TOPICAL
Qty: 1 | Refills: 3 | Status: ACTIVE | COMMUNITY
Start: 2024-06-04 | End: 1900-01-01

## 2024-06-04 RX ORDER — ESTRADIOL 0.1 MG/G
0.1 CREAM VAGINAL
Qty: 1 | Refills: 3 | Status: ACTIVE | COMMUNITY
Start: 2024-06-04 | End: 1900-01-01

## 2024-06-04 NOTE — HISTORY OF PRESENT ILLNESS
[FreeTextEntry1] : Marianela is an 85 yo who is followed for urethral caruncle v. prolapse, severe vaginal atrophy, lichen sclerosis, and OAB. She was last seen in November 2023 at which time she was alternating clobetasol and vaginal estrogen. She reported improved in vulvar burning and her OAB was improved with fluid changes.  Today she reports, she is doing well overall. She had been using clobetasol and vaginal estrogen. She weaned off of clobetasol completely two weeks ago but still feels irritation and burning intermittently. She continues to use estrogen twice per week.   She denies any OAB symptoms or any urinary complaints at this time.

## 2024-06-04 NOTE — DISCUSSION/SUMMARY
[FreeTextEntry1] : 1. Lichen sclerosus/vulvar lichenification - Continue Clobetasol three times per week  - Reviewed that should she experience any change in appearance or develop ulcer, needs to call for examination sooner  2. Severe atrophy/urethral caruncle  - Continue vaginal estrogen twice per week at bedtime   3. OAB - Remains unbothered by symptoms at this time, given fluid/behavioral modifications   RTO in 6 months, or sooner, as needed 
no

## 2024-06-04 NOTE — PHYSICAL EXAM
[Chaperone Present] : A chaperone was present in the examining room during all aspects of the physical examination [00190] : A chaperone was present during the pelvic exam. [Labia Majora] : were normal [Labia Minora] : were normal [Normal Appearance] : general appearance was normal [Atrophy] : atrophy [Normal] : no abnormalities [Vulvar Atrophy] : vulvar atrophy [FreeTextEntry1] : General: Well, appearing. Alert and orientated. No acute distress HEENT: Normocephalic, atraumatic and extraocular muscles appear to be intact  Neck: Full range of motion, no obvious lymphadenopathy, deformities, or masses noted  Respiratory: Speaking in full sentences comfortably, normal work of breathing and no cough during visit Musculoskeletal: active full range of motion in extremities  Extremities: No upper extremity edema noted Skin: no obvious rash or skin lesions Neuro: Orientated X 3, speech is fluent, normal rate Psych: Normal mood and affect  [Tenderness] : ~T no ~M abdominal tenderness observed [Distended] : not distended [de-identified] : mild erythema, no hypopigmentation [FreeTextEntry3] : +caruncle noted, nonerythematous

## 2024-06-17 ENCOUNTER — APPOINTMENT (OUTPATIENT)
Dept: FAMILY MEDICINE | Facility: CLINIC | Age: 84
End: 2024-06-17
Payer: MEDICARE

## 2024-06-17 VITALS
TEMPERATURE: 98.3 F | OXYGEN SATURATION: 98 % | WEIGHT: 109 LBS | HEART RATE: 61 BPM | RESPIRATION RATE: 14 BRPM | SYSTOLIC BLOOD PRESSURE: 108 MMHG | BODY MASS INDEX: 18.14 KG/M2 | DIASTOLIC BLOOD PRESSURE: 58 MMHG

## 2024-06-17 DIAGNOSIS — Z23 ENCOUNTER FOR IMMUNIZATION: ICD-10-CM

## 2024-06-17 DIAGNOSIS — Z87.898 PERSONAL HISTORY OF OTHER SPECIFIED CONDITIONS: ICD-10-CM

## 2024-06-17 DIAGNOSIS — M81.0 AGE-RELATED OSTEOPOROSIS W/OUT CURRENT PATHOLOGICAL FRACTURE: ICD-10-CM

## 2024-06-17 DIAGNOSIS — Z00.00 ENCOUNTER FOR GENERAL ADULT MEDICAL EXAMINATION W/OUT ABNORMAL FINDINGS: ICD-10-CM

## 2024-06-17 DIAGNOSIS — D46.Z OTHER MYELODYSPLASTIC SYNDROMES: ICD-10-CM

## 2024-06-17 PROCEDURE — 90715 TDAP VACCINE 7 YRS/> IM: CPT | Mod: GY

## 2024-06-17 PROCEDURE — G0439: CPT

## 2024-06-17 PROCEDURE — 90471 IMMUNIZATION ADMIN: CPT

## 2024-06-17 RX ORDER — ALPRAZOLAM 0.25 MG/1
0.25 TABLET ORAL
Qty: 60 | Refills: 0 | Status: ACTIVE | COMMUNITY
Start: 1900-01-01 | End: 1900-01-01

## 2024-06-17 NOTE — REVIEW OF SYSTEMS
[Joint Pain] : joint pain [FreeTextEntry9] : shoulder pain left  [Diarrhea] : diarrhea [Negative] : Psychiatric [FreeTextEntry7] : IBS [FreeTextEntry8] : as above  [de-identified] : skin screenings with Dr. Das

## 2024-06-17 NOTE — HISTORY OF PRESENT ILLNESS
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 urethral caruncle, lichen sclerosis who presents for AWV. She is feeling well today. She continues to take alprazolam for sleep. Has been using steroid cream and vaseline for lichen sclerosis. Follows with uro gyn. She recently got hearing aids. Patient requests screening EKG today. Currently denies chest pain, shortness of breath, or palpitations.   Next CBC due September 2024  Eye doctor: Dr. Adam  Dentist up to date

## 2024-06-17 NOTE — PHYSICAL EXAM
[Coordination Grossly Intact] : coordination grossly intact [No Focal Deficits] : no focal deficits [Normal] : affect was normal and insight and judgment were intact [de-identified] : wearing glasses

## 2024-06-17 NOTE — HEALTH RISK ASSESSMENT
[Good] : ~his/her~ current health as good [Yes] : Yes [Monthly or less (1 pt)] : Monthly or less (1 point) [1 or 2 (0 pts)] : 1 or 2 (0 points) [Never (0 pts)] : Never (0 points) [No falls in past year] : Patient reported no falls in the past year [Patient reported mammogram was normal] : Patient reported mammogram was normal [Patient reported bone density results were abnormal] : Patient reported bone density results were abnormal [With Significant Other] : lives with significant other [Retired] : retired [] :  [Fully functional (bathing, dressing, toileting, transferring, walking, feeding)] : Fully functional (bathing, dressing, toileting, transferring, walking, feeding) [Fully functional (using the telephone, shopping, preparing meals, housekeeping, doing laundry, using] : Fully functional and needs no help or supervision to perform IADLs (using the telephone, shopping, preparing meals, housekeeping, doing laundry, using transportation, managing medications and managing finances) [With Patient/Caregiver] : , with patient/caregiver [Reviewed no changes] : Reviewed, no changes [Designated Healthcare Proxy] : Designated healthcare proxy [Name: ___] : Health Care Proxy's Name: [unfilled]  [Relationship: ___] : Relationship: [unfilled] [No] : In the past 12 months have you used drugs other than those required for medical reasons? No [0] : 2) Feeling down, depressed, or hopeless: Not at all (0) [PHQ-2 Negative - No further assessment needed] : PHQ-2 Negative - No further assessment needed [[id number]] : 0 [Never] : Never [None] : None [Reports changes in hearing] : Reports no changes in hearing [Reports changes in vision] : Reports no changes in vision [Reports changes in dental health] : Reports no changes in dental health [MammogramDate] : 2023 [BoneDensityDate] : 2021 [BoneDensityComments] : osteoporosis [AdvancecareDate] : 06/17/2024

## 2024-07-17 ENCOUNTER — APPOINTMENT (OUTPATIENT)
Dept: UROGYNECOLOGY | Facility: CLINIC | Age: 84
End: 2024-07-17
Payer: MEDICARE

## 2024-07-17 VITALS
DIASTOLIC BLOOD PRESSURE: 74 MMHG | BODY MASS INDEX: 18.38 KG/M2 | WEIGHT: 109 LBS | SYSTOLIC BLOOD PRESSURE: 119 MMHG | HEIGHT: 64.5 IN

## 2024-07-17 DIAGNOSIS — N90.89 OTHER SPECIFIED NONINFLAMMATORY DISORDERS OF VULVA AND PERINEUM: ICD-10-CM

## 2024-07-17 DIAGNOSIS — L90.0 LICHEN SCLEROSUS ET ATROPHICUS: ICD-10-CM

## 2024-07-17 DIAGNOSIS — N95.2 POSTMENOPAUSAL ATROPHIC VAGINITIS: ICD-10-CM

## 2024-07-17 PROCEDURE — 99214 OFFICE O/P EST MOD 30 MIN: CPT

## 2024-07-17 RX ORDER — CLOBETASOL PROPIONATE 0.5 MG/G
0.05 OINTMENT TOPICAL
Qty: 1 | Refills: 3 | Status: ACTIVE | COMMUNITY
Start: 2024-07-17 | End: 1900-01-01

## 2024-07-30 ENCOUNTER — APPOINTMENT (OUTPATIENT)
Dept: ORTHOPEDIC SURGERY | Facility: CLINIC | Age: 84
End: 2024-07-30

## 2024-08-05 ENCOUNTER — NON-APPOINTMENT (OUTPATIENT)
Age: 84
End: 2024-08-05

## 2024-08-12 ENCOUNTER — TRANSCRIPTION ENCOUNTER (OUTPATIENT)
Age: 84
End: 2024-08-12

## 2024-08-12 ENCOUNTER — NON-APPOINTMENT (OUTPATIENT)
Age: 84
End: 2024-08-12

## 2024-08-12 DIAGNOSIS — L28.1 PRURIGO NODULARIS: ICD-10-CM

## 2024-08-12 DIAGNOSIS — L57.0 ACTINIC KERATOSIS: ICD-10-CM

## 2024-08-12 DIAGNOSIS — L57.8 OTHER SKIN CHANGES DUE TO CHRONIC EXPOSURE TO NONIONIZING RADIATION: ICD-10-CM

## 2024-08-12 DIAGNOSIS — D22.71 MELANOCYTIC NEVI OF RIGHT LOWER LIMB, INCLUDING HIP: ICD-10-CM

## 2024-08-12 DIAGNOSIS — K58.9 IRRITABLE BOWEL SYNDROME W/OUT DIARRHEA: ICD-10-CM

## 2024-08-12 DIAGNOSIS — L82.1 OTHER SEBORRHEIC KERATOSIS: ICD-10-CM

## 2024-08-12 DIAGNOSIS — Z86.19 PERSONAL HISTORY OF OTHER INFECTIOUS AND PARASITIC DISEASES: ICD-10-CM

## 2024-08-12 DIAGNOSIS — Z85.828 PERSONAL HISTORY OF OTHER MALIGNANT NEOPLASM OF SKIN: ICD-10-CM

## 2024-08-13 ENCOUNTER — TRANSCRIPTION ENCOUNTER (OUTPATIENT)
Age: 84
End: 2024-08-13

## 2024-08-15 ENCOUNTER — NON-APPOINTMENT (OUTPATIENT)
Age: 84
End: 2024-08-15

## 2024-08-19 ENCOUNTER — APPOINTMENT (OUTPATIENT)
Dept: UROGYNECOLOGY | Facility: CLINIC | Age: 84
End: 2024-08-19
Payer: MEDICARE

## 2024-08-19 DIAGNOSIS — N36.2 URETHRAL CARUNCLE: ICD-10-CM

## 2024-08-19 DIAGNOSIS — L90.0 LICHEN SCLEROSUS ET ATROPHICUS: ICD-10-CM

## 2024-08-19 PROCEDURE — 99213 OFFICE O/P EST LOW 20 MIN: CPT

## 2024-08-19 NOTE — PHYSICAL EXAM
[No Acute Distress] : in no acute distress [Well developed] : well developed [Oriented x3] : oriented to person, place, and time [Well Nourished] : ~L well nourished [Vulvar Atrophy] : vulvar atrophy [Labia Majora] : were normal [Labia Minora] : were normal [Normal] : was normal [Normal Appearance] : general appearance was normal [Atrophy] : atrophy [Tenderness] : ~T no ~M abdominal tenderness observed [Distended] : not distended [de-identified] : mild erythema and lichenification, no hypopigmentation [FreeTextEntry3] : +caruncle noted, nonerythematous

## 2024-08-19 NOTE — HISTORY OF PRESENT ILLNESS
[FreeTextEntry1] : Marianela is an 85 yo presents for follow up with a h/o urethral caruncle v. urethral prolapse, severe vaginal atrophy, lichen sclerosis, and OAB. She is using clobetasol and vaginal estrogen. She was seen on 7/17 and instructed to apply the Clobetasol nightly for 4 weeks then taper to every other day.  She reports that she has used as directed for the past 4 weeks with improvement in vaginal skin irritation.  She is now using every other day but wants to ensure that she is applying the medication correctly, therefore she presents today for instruction. Of note, she reports that she was seen at an urgent care office a week ago for presumed UTI.  She reports that she was given Fosfomycin with resolution in symptoms. She denies current OAB symptoms.

## 2024-08-19 NOTE — HISTORY OF PRESENT ILLNESS
[FreeTextEntry1] : Marianela is an 83 yo presents for follow up with a h/o urethral caruncle v. urethral prolapse, severe vaginal atrophy, lichen sclerosis, and OAB. She is using clobetasol and vaginal estrogen. She was seen on 7/17 and instructed to apply the Clobetasol nightly for 4 weeks then taper to every other day.  She reports that she has used as directed for the past 4 weeks with improvement in vaginal skin irritation.  She is now using every other day but wants to ensure that she is applying the medication correctly, therefore she presents today for instruction. Of note, she reports that she was seen at an urgent care office a week ago for presumed UTI.  She reports that she was given Fosfomycin with resolution in symptoms. She denies current OAB symptoms.

## 2024-08-19 NOTE — PHYSICAL EXAM
[No Acute Distress] : in no acute distress [Well developed] : well developed [Well Nourished] : ~L well nourished [Oriented x3] : oriented to person, place, and time [Vulvar Atrophy] : vulvar atrophy [Labia Majora] : were normal [Labia Minora] : were normal [Normal] : was normal [Normal Appearance] : general appearance was normal [Atrophy] : atrophy [Tenderness] : ~T no ~M abdominal tenderness observed [Distended] : not distended [de-identified] : mild erythema and lichenification, no hypopigmentation [FreeTextEntry3] : +caruncle noted, nonerythematous

## 2024-08-19 NOTE — DISCUSSION/SUMMARY
[FreeTextEntry1] : LS, vulvar irritation:  -Instructions of use of Clobetasol reviewed.  She was instructed to use 1/2 fingertip amount nightly every other night -Use barrier ointments during the day. She denies pad use -RTO as scheduled on 9/16 with Dr. Treadwell  -If no improvement, will send to vulvar derm  Atrophy, caruncle: -Continue with low dose vaginal estrogen cream biw   UTI -Currently asymptomatic  -She was instructed to f/u prn if symtpoms recur All questions were answered to her satisfaction

## 2024-08-22 ENCOUNTER — NON-APPOINTMENT (OUTPATIENT)
Age: 84
End: 2024-08-22

## 2024-08-25 ENCOUNTER — NON-APPOINTMENT (OUTPATIENT)
Age: 84
End: 2024-08-25

## 2024-08-28 ENCOUNTER — NON-APPOINTMENT (OUTPATIENT)
Age: 84
End: 2024-08-28

## 2024-09-03 ENCOUNTER — TRANSCRIPTION ENCOUNTER (OUTPATIENT)
Age: 84
End: 2024-09-03

## 2024-09-09 ENCOUNTER — APPOINTMENT (OUTPATIENT)
Dept: NEUROLOGY | Facility: CLINIC | Age: 84
End: 2024-09-09
Payer: MEDICARE

## 2024-09-09 VITALS
TEMPERATURE: 98.1 F | HEART RATE: 65 BPM | SYSTOLIC BLOOD PRESSURE: 129 MMHG | HEIGHT: 64.5 IN | DIASTOLIC BLOOD PRESSURE: 78 MMHG | BODY MASS INDEX: 18.72 KG/M2 | WEIGHT: 111 LBS | OXYGEN SATURATION: 98 %

## 2024-09-09 VITALS
HEART RATE: 65 BPM | DIASTOLIC BLOOD PRESSURE: 78 MMHG | HEIGHT: 65 IN | SYSTOLIC BLOOD PRESSURE: 129 MMHG | WEIGHT: 111 LBS | BODY MASS INDEX: 18.49 KG/M2 | TEMPERATURE: 98.1 F | OXYGEN SATURATION: 98 %

## 2024-09-09 DIAGNOSIS — M47.812 SPONDYLOSIS W/OUT MYELOPATHY OR RADICULOPATHY, CERVICAL REGION: ICD-10-CM

## 2024-09-09 DIAGNOSIS — M47.816 SPONDYLOSIS W/OUT MYELOPATHY OR RADICULOPATHY, LUMBAR REGION: ICD-10-CM

## 2024-09-09 PROCEDURE — 99214 OFFICE O/P EST MOD 30 MIN: CPT

## 2024-09-09 PROCEDURE — G2211 COMPLEX E/M VISIT ADD ON: CPT

## 2024-09-09 RX ORDER — CYCLOSPORINE 0 G/ML
0.09 SOLUTION/ DROPS OPHTHALMIC; TOPICAL
Refills: 0 | Status: ACTIVE | COMMUNITY

## 2024-09-09 NOTE — PHYSICAL EXAM
[FreeTextEntry1] : Head:  Normocephalic Neck: Mild decreased range of motion nontender.  Spine: Nontender negative straight leg raising.  Mental Status:  Alert Oriented X3 Speech normal and no aphasia or dysarthria.  Cranial Nerves:  PERRL,  Visual Fields full  EOMI no diplopia no ptosis no nystagmus, V through XII intact.  Motor:  No drift, normal strength tone and coordination and no focal atrophy. No abnormal movements. No dysmetria.  Normal rapid alternating movements.   DTRs: Symmetric and 2+.  Plantars equivocal  Sensory: Unchanged reduced vibration sense distally in the lowers.  Normal pin and touch.  Gait: Regular.  Able to attempt tandem walk negative Romberg.

## 2024-09-09 NOTE — ASSESSMENT
[FreeTextEntry1] : Impression: This 84-year-old female patient has cervical and lumbar spinal stenosis and she has significant spondylolisthesis of the cervical spine as well.  She has sensory complaints upper and lower extremities.  Today's exam is basically unchanged.  Plantar responses today are equivocal.  She has chronic decreased vibration sense distally in the lowers.  Recommendations: Conservative management to be pursued.  Physical therapy.  Office follow-up in 1 year or as needed.

## 2024-09-09 NOTE — HISTORY OF PRESENT ILLNESS
[FreeTextEntry1] : The patient is seen for an office visit and her condition is stable.  She has chronic neck and lower back discomfort.  She has paresthesia affecting the fingertips of both hands and in the feet.  She has an occasional shocklike sensation in either foot.  MRI of the cervical spine 6/27/2022 did reveal spondylosis moderate to severe cervical spinal stenosis and areas of spondylolisthesis.  Lumbar MRI 8/4/2021 did reveal multilevel spondylosis and areas of stenosis.  She continues to receive physical therapy.

## 2024-09-13 ENCOUNTER — TRANSCRIPTION ENCOUNTER (OUTPATIENT)
Age: 84
End: 2024-09-13

## 2024-09-16 ENCOUNTER — APPOINTMENT (OUTPATIENT)
Dept: UROGYNECOLOGY | Facility: CLINIC | Age: 84
End: 2024-09-16
Payer: MEDICARE

## 2024-09-16 VITALS
HEIGHT: 65 IN | WEIGHT: 111 LBS | DIASTOLIC BLOOD PRESSURE: 75 MMHG | BODY MASS INDEX: 18.49 KG/M2 | SYSTOLIC BLOOD PRESSURE: 163 MMHG | HEART RATE: 75 BPM

## 2024-09-16 DIAGNOSIS — N36.8 OTHER SPECIFIED DISORDERS OF URETHRA: ICD-10-CM

## 2024-09-16 DIAGNOSIS — L90.0 LICHEN SCLEROSUS ET ATROPHICUS: ICD-10-CM

## 2024-09-16 PROCEDURE — 99459 PELVIC EXAMINATION: CPT

## 2024-09-16 PROCEDURE — 99214 OFFICE O/P EST MOD 30 MIN: CPT

## 2024-09-16 RX ORDER — ESTRADIOL 0.1 MG/G
0.1 CREAM VAGINAL
Qty: 1 | Refills: 3 | Status: ACTIVE | COMMUNITY
Start: 2024-09-16 | End: 1900-01-01

## 2024-09-16 RX ORDER — CLOBETASOL PROPIONATE 0.5 MG/G
0.05 OINTMENT TOPICAL
Qty: 1 | Refills: 3 | Status: ACTIVE | COMMUNITY
Start: 2024-09-16 | End: 1900-01-01

## 2024-09-16 NOTE — HISTORY OF PRESENT ILLNESS
[FreeTextEntry1] : Marianela presents for follow up with a h/o urethral caruncle v. urethral prolapse, severe vaginal atrophy, lichen sclerosis, and OAB. She is using clobetasol and vaginal estrogen. She is currently using the vaginal estrogen biw and the clobetasol tiw. She reports some irritation perianally.  She denies current OAB symptoms.

## 2024-09-16 NOTE — PHYSICAL EXAM
[Chaperone Present] : A chaperone was present in the examining room during all aspects of the physical examination [60477] : A chaperone was present during the pelvic exam. [No Acute Distress] : in no acute distress [Well developed] : well developed [Well Nourished] : ~L well nourished [Oriented x3] : oriented to person, place, and time [Vulvar Atrophy] : vulvar atrophy [Labia Majora] : were normal [Labia Minora] : were normal [Mucosal Prolapse] : had a mucosal prolapse [Normal Appearance] : general appearance was normal [Atrophy] : atrophy [Normal] : was normal [Normal rectal exam] : was normal [de-identified] : mild erythema and lichenification, no hypopigmentation. No ulcerations [FreeTextEntry3] :  exam findings more c/w urethral prolapse. Non friable [de-identified] : no perianal lesions or plaques

## 2024-09-16 NOTE — DISCUSSION/SUMMARY
[FreeTextEntry1] : LS, vulvar irritation:  -Instructions of use of Clobetasol reviewed.  She was instructed to use 1/2 fingertip amount nightly every other night -We discussed chronicity of condition and how to apply and dose cream based on symptoms. Precautions also reviewed. If she sees lesions or ulcerations, will come in for evaluation and biopsy immediately. We discussed the risk of SCC.  -Use barrier ointments during the day. She denies pad use -RTO in 6 mo for follow up, or sooner if issues arise.   Atrophy, urethral prolpase: -Continue with low dose vaginal estrogen cream biw   UTI -Currently asymptomatic  -She was instructed to f/u prn if symtpoms recur All questions were answered to her satisfaction

## 2024-09-23 ENCOUNTER — APPOINTMENT (OUTPATIENT)
Dept: FAMILY MEDICINE | Facility: CLINIC | Age: 84
End: 2024-09-23
Payer: MEDICARE

## 2024-09-23 VITALS
SYSTOLIC BLOOD PRESSURE: 109 MMHG | HEIGHT: 65 IN | BODY MASS INDEX: 18.33 KG/M2 | OXYGEN SATURATION: 96 % | HEART RATE: 64 BPM | DIASTOLIC BLOOD PRESSURE: 54 MMHG | WEIGHT: 110 LBS | RESPIRATION RATE: 14 BRPM | TEMPERATURE: 98.3 F

## 2024-09-23 DIAGNOSIS — D46.9 MYELODYSPLASTIC SYNDROME, UNSPECIFIED: ICD-10-CM

## 2024-09-23 DIAGNOSIS — D64.9 ANEMIA, UNSPECIFIED: ICD-10-CM

## 2024-09-23 PROCEDURE — G2211 COMPLEX E/M VISIT ADD ON: CPT

## 2024-09-23 PROCEDURE — 99213 OFFICE O/P EST LOW 20 MIN: CPT

## 2024-09-23 NOTE — HISTORY OF PRESENT ILLNESS
Ice ankle 3 times daily   Tylenol scheduled 975 mg every 8 hours for next two weeks.  Topical diclofenac as needed applied to left ankle for pain.   Elevate ankle when resting to level of heart   Keep ace bandage wrapped around ankle for compression.   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 urethral caruncle, lichen sclerosis who presents for follow up of blood work. Has a close family member who is very ill and that has been very stressful for her and her family.   Labs done 07/29/2024

## 2024-09-23 NOTE — ASSESSMENT
[FreeTextEntry1] : I am providing continuous care for this patient that includes testing, discussion of options for treatment, and shared decision making with regard to the chosen treatment.  Prefers to do flu vaccine at later date.

## 2024-11-14 ENCOUNTER — APPOINTMENT (OUTPATIENT)
Dept: UROLOGY | Facility: CLINIC | Age: 84
End: 2024-11-14
Payer: MEDICARE

## 2024-11-14 VITALS
OXYGEN SATURATION: 98 % | HEIGHT: 65 IN | TEMPERATURE: 97.7 F | HEART RATE: 61 BPM | BODY MASS INDEX: 18.33 KG/M2 | WEIGHT: 110 LBS | SYSTOLIC BLOOD PRESSURE: 122 MMHG | DIASTOLIC BLOOD PRESSURE: 68 MMHG

## 2024-11-14 DIAGNOSIS — N90.89 OTHER SPECIFIED NONINFLAMMATORY DISORDERS OF VULVA AND PERINEUM: ICD-10-CM

## 2024-11-14 DIAGNOSIS — N36.2 URETHRAL CARUNCLE: ICD-10-CM

## 2024-11-14 DIAGNOSIS — N95.2 POSTMENOPAUSAL ATROPHIC VAGINITIS: ICD-10-CM

## 2024-11-14 LAB
BILIRUB UR QL STRIP: NORMAL
CLARITY UR: CLEAR
COLLECTION METHOD: NORMAL
GLUCOSE UR-MCNC: NEGATIVE
HCG UR QL: 0.2 EU/DL
HGB UR QL STRIP.AUTO: ABNORMAL
KETONES UR-MCNC: NEGATIVE
LEUKOCYTE ESTERASE UR QL STRIP: ABNORMAL
NITRITE UR QL STRIP: NEGATIVE
PH UR STRIP: 8.5
PROT UR STRIP-MCNC: ABNORMAL
SP GR UR STRIP: 1.01

## 2024-11-14 PROCEDURE — 99214 OFFICE O/P EST MOD 30 MIN: CPT

## 2024-11-14 PROCEDURE — 99459 PELVIC EXAMINATION: CPT

## 2024-11-14 PROCEDURE — 51798 US URINE CAPACITY MEASURE: CPT

## 2024-11-15 ENCOUNTER — TRANSCRIPTION ENCOUNTER (OUTPATIENT)
Age: 84
End: 2024-11-15

## 2024-11-19 ENCOUNTER — TRANSCRIPTION ENCOUNTER (OUTPATIENT)
Age: 84
End: 2024-11-19

## 2024-11-19 ENCOUNTER — APPOINTMENT (OUTPATIENT)
Dept: DERMATOLOGY | Facility: CLINIC | Age: 84
End: 2024-11-19
Payer: MEDICARE

## 2024-11-19 DIAGNOSIS — I78.1 NEVUS, NON-NEOPLASTIC: ICD-10-CM

## 2024-11-19 DIAGNOSIS — Z85.828 PERSONAL HISTORY OF OTHER MALIGNANT NEOPLASM OF SKIN: ICD-10-CM

## 2024-11-19 DIAGNOSIS — L82.1 OTHER SEBORRHEIC KERATOSIS: ICD-10-CM

## 2024-11-19 DIAGNOSIS — L57.8 OTHER SKIN CHANGES DUE TO CHRONIC EXPOSURE TO NONIONIZING RADIATION: ICD-10-CM

## 2024-11-19 PROCEDURE — 99203 OFFICE O/P NEW LOW 30 MIN: CPT

## 2024-11-19 PROCEDURE — 99213 OFFICE O/P EST LOW 20 MIN: CPT

## 2024-11-20 ENCOUNTER — TRANSCRIPTION ENCOUNTER (OUTPATIENT)
Age: 84
End: 2024-11-20

## 2024-11-20 ENCOUNTER — APPOINTMENT (OUTPATIENT)
Dept: OTOLARYNGOLOGY | Facility: CLINIC | Age: 84
End: 2024-11-20
Payer: MEDICARE

## 2024-11-20 VITALS
TEMPERATURE: 96.8 F | WEIGHT: 110 LBS | OXYGEN SATURATION: 97 % | HEIGHT: 65 IN | HEART RATE: 64 BPM | BODY MASS INDEX: 18.33 KG/M2

## 2024-11-20 DIAGNOSIS — J31.0 CHRONIC RHINITIS: ICD-10-CM

## 2024-11-20 DIAGNOSIS — H61.23 IMPACTED CERUMEN, BILATERAL: ICD-10-CM

## 2024-11-20 LAB
APPEARANCE: CLEAR
BACTERIA UR CULT: NORMAL
BACTERIA: NEGATIVE /HPF
BILIRUBIN URINE: NEGATIVE
BLOOD URINE: NEGATIVE
CAST: 0 /LPF
COLOR: YELLOW
EPITHELIAL CELLS: 0 /HPF
GLUCOSE QUALITATIVE U: NEGATIVE MG/DL
KETONES URINE: NEGATIVE MG/DL
LEUKOCYTE ESTERASE URINE: ABNORMAL
MICROSCOPIC-UA: NORMAL
NITRITE URINE: NEGATIVE
PH URINE: 8.5
PROTEIN URINE: NORMAL MG/DL
RED BLOOD CELLS URINE: 0 /HPF
REVIEW: NORMAL
SPECIFIC GRAVITY URINE: 1.02
UROBILINOGEN URINE: 0.2 MG/DL
WHITE BLOOD CELLS URINE: 7 /HPF

## 2024-11-20 PROCEDURE — 92511 NASOPHARYNGOSCOPY: CPT

## 2024-11-20 PROCEDURE — 99203 OFFICE O/P NEW LOW 30 MIN: CPT | Mod: 25

## 2024-11-20 PROCEDURE — 69210 REMOVE IMPACTED EAR WAX UNI: CPT

## 2024-11-20 RX ORDER — FLUTICASONE PROPIONATE 50 UG/1
50 SPRAY, METERED NASAL DAILY
Qty: 1 | Refills: 11 | Status: ACTIVE | COMMUNITY
Start: 2024-11-20 | End: 1900-01-01

## 2024-11-20 RX ORDER — AZELASTINE HYDROCHLORIDE 137 UG/1
137 SPRAY, METERED NASAL
Qty: 1 | Refills: 6 | Status: ACTIVE | COMMUNITY
Start: 2024-11-20 | End: 1900-01-01

## 2024-11-22 ENCOUNTER — TRANSCRIPTION ENCOUNTER (OUTPATIENT)
Age: 84
End: 2024-11-22

## 2024-11-25 ENCOUNTER — APPOINTMENT (OUTPATIENT)
Dept: UROLOGY | Facility: CLINIC | Age: 84
End: 2024-11-25

## 2024-11-28 ENCOUNTER — NON-APPOINTMENT (OUTPATIENT)
Age: 84
End: 2024-11-28

## 2024-12-03 ENCOUNTER — APPOINTMENT (OUTPATIENT)
Dept: UROGYNECOLOGY | Facility: CLINIC | Age: 84
End: 2024-12-03

## 2024-12-03 ENCOUNTER — APPOINTMENT (OUTPATIENT)
Dept: UROLOGY | Facility: CLINIC | Age: 84
End: 2024-12-03
Payer: MEDICARE

## 2024-12-03 VITALS
WEIGHT: 110 LBS | SYSTOLIC BLOOD PRESSURE: 130 MMHG | BODY MASS INDEX: 18.33 KG/M2 | DIASTOLIC BLOOD PRESSURE: 63 MMHG | HEIGHT: 65 IN

## 2024-12-03 DIAGNOSIS — N90.89 OTHER SPECIFIED NONINFLAMMATORY DISORDERS OF VULVA AND PERINEUM: ICD-10-CM

## 2024-12-03 PROCEDURE — 99212 OFFICE O/P EST SF 10 MIN: CPT | Mod: 25

## 2024-12-03 PROCEDURE — 56605 BIOPSY OF VULVA/PERINEUM: CPT

## 2024-12-04 RX ORDER — CEPHALEXIN 500 MG/1
500 CAPSULE ORAL
Qty: 6 | Refills: 0 | Status: DISCONTINUED | COMMUNITY
Start: 2024-12-03 | End: 2024-12-04

## 2024-12-04 RX ORDER — DOXYCLYCLINE HYCLATE 150 MG/1
150 TABLET, COATED ORAL
Qty: 6 | Refills: 0 | Status: ACTIVE | COMMUNITY
Start: 2024-12-04 | End: 1900-01-01

## 2024-12-05 ENCOUNTER — LABORATORY RESULT (OUTPATIENT)
Age: 84
End: 2024-12-05

## 2024-12-06 ENCOUNTER — TRANSCRIPTION ENCOUNTER (OUTPATIENT)
Age: 84
End: 2024-12-06

## 2024-12-12 ENCOUNTER — TRANSCRIPTION ENCOUNTER (OUTPATIENT)
Age: 84
End: 2024-12-12

## 2024-12-18 ENCOUNTER — APPOINTMENT (OUTPATIENT)
Dept: UROLOGY | Facility: CLINIC | Age: 84
End: 2024-12-18
Payer: MEDICARE

## 2024-12-18 ENCOUNTER — NON-APPOINTMENT (OUTPATIENT)
Age: 84
End: 2024-12-18

## 2024-12-18 DIAGNOSIS — L90.0 LICHEN SCLEROSUS ET ATROPHICUS: ICD-10-CM

## 2024-12-18 DIAGNOSIS — N90.89 OTHER SPECIFIED NONINFLAMMATORY DISORDERS OF VULVA AND PERINEUM: ICD-10-CM

## 2024-12-18 PROCEDURE — 99441: CPT | Mod: 93

## 2024-12-20 ENCOUNTER — TRANSCRIPTION ENCOUNTER (OUTPATIENT)
Age: 84
End: 2024-12-20

## 2024-12-20 ENCOUNTER — NON-APPOINTMENT (OUTPATIENT)
Age: 84
End: 2024-12-20

## 2024-12-24 ENCOUNTER — APPOINTMENT (OUTPATIENT)
Dept: UROLOGY | Facility: CLINIC | Age: 84
End: 2024-12-24
Payer: MEDICARE

## 2024-12-24 VITALS
DIASTOLIC BLOOD PRESSURE: 70 MMHG | WEIGHT: 110 LBS | BODY MASS INDEX: 18.33 KG/M2 | SYSTOLIC BLOOD PRESSURE: 110 MMHG | HEIGHT: 65 IN

## 2024-12-24 PROBLEM — N39.0 RECURRENT UTI: Status: ACTIVE | Noted: 2024-12-24

## 2024-12-24 PROCEDURE — 99459 PELVIC EXAMINATION: CPT

## 2024-12-24 PROCEDURE — 99213 OFFICE O/P EST LOW 20 MIN: CPT

## 2024-12-24 RX ORDER — FOSFOMYCIN TROMETHAMINE 3 G/1
3 POWDER ORAL
Qty: 1 | Refills: 2 | Status: ACTIVE | COMMUNITY
Start: 2024-12-24 | End: 1900-01-01

## 2024-12-26 ENCOUNTER — TRANSCRIPTION ENCOUNTER (OUTPATIENT)
Age: 84
End: 2024-12-26

## 2024-12-27 ENCOUNTER — APPOINTMENT (OUTPATIENT)
Dept: UROLOGY | Facility: CLINIC | Age: 84
End: 2024-12-27

## 2025-01-02 ENCOUNTER — APPOINTMENT (OUTPATIENT)
Dept: FAMILY MEDICINE | Facility: CLINIC | Age: 85
End: 2025-01-02
Payer: MEDICARE

## 2025-01-02 VITALS
SYSTOLIC BLOOD PRESSURE: 137 MMHG | DIASTOLIC BLOOD PRESSURE: 69 MMHG | HEART RATE: 69 BPM | RESPIRATION RATE: 14 BRPM | WEIGHT: 109 LBS | HEIGHT: 65 IN | BODY MASS INDEX: 18.16 KG/M2 | TEMPERATURE: 97.9 F | OXYGEN SATURATION: 98 %

## 2025-01-02 DIAGNOSIS — Z87.898 PERSONAL HISTORY OF OTHER SPECIFIED CONDITIONS: ICD-10-CM

## 2025-01-02 DIAGNOSIS — D64.9 ANEMIA, UNSPECIFIED: ICD-10-CM

## 2025-01-02 DIAGNOSIS — L98.9 DISORDER OF THE SKIN AND SUBCUTANEOUS TISSUE, UNSPECIFIED: ICD-10-CM

## 2025-01-02 DIAGNOSIS — H04.129 DRY EYE SYNDROME OF UNSPECIFIED LACRIMAL GLAND: ICD-10-CM

## 2025-01-02 DIAGNOSIS — Z87.2 PERSONAL HISTORY OF DISEASES OF THE SKIN AND SUBCUTANEOUS TISSUE: ICD-10-CM

## 2025-01-02 PROCEDURE — 99213 OFFICE O/P EST LOW 20 MIN: CPT

## 2025-01-02 PROCEDURE — G2211 COMPLEX E/M VISIT ADD ON: CPT

## 2025-01-03 ENCOUNTER — TRANSCRIPTION ENCOUNTER (OUTPATIENT)
Age: 85
End: 2025-01-03

## 2025-01-03 ENCOUNTER — NON-APPOINTMENT (OUTPATIENT)
Age: 85
End: 2025-01-03

## 2025-01-03 DIAGNOSIS — N39.0 URINARY TRACT INFECTION, SITE NOT SPECIFIED: ICD-10-CM

## 2025-01-06 ENCOUNTER — TRANSCRIPTION ENCOUNTER (OUTPATIENT)
Age: 85
End: 2025-01-06

## 2025-01-07 ENCOUNTER — TRANSCRIPTION ENCOUNTER (OUTPATIENT)
Age: 85
End: 2025-01-07

## 2025-01-08 ENCOUNTER — APPOINTMENT (OUTPATIENT)
Dept: GYNECOLOGIC ONCOLOGY | Facility: CLINIC | Age: 85
End: 2025-01-08
Payer: MEDICARE

## 2025-01-08 VITALS
OXYGEN SATURATION: 99 % | SYSTOLIC BLOOD PRESSURE: 118 MMHG | DIASTOLIC BLOOD PRESSURE: 52 MMHG | WEIGHT: 110 LBS | BODY MASS INDEX: 18.33 KG/M2 | HEIGHT: 65 IN | TEMPERATURE: 97.3 F | HEART RATE: 60 BPM

## 2025-01-08 DIAGNOSIS — N90.89 OTHER SPECIFIED NONINFLAMMATORY DISORDERS OF VULVA AND PERINEUM: ICD-10-CM

## 2025-01-08 DIAGNOSIS — L90.0 LICHEN SCLEROSUS ET ATROPHICUS: ICD-10-CM

## 2025-01-08 PROCEDURE — 99459 PELVIC EXAMINATION: CPT

## 2025-01-08 PROCEDURE — 99203 OFFICE O/P NEW LOW 30 MIN: CPT

## 2025-01-15 ENCOUNTER — TRANSCRIPTION ENCOUNTER (OUTPATIENT)
Age: 85
End: 2025-01-15

## 2025-01-17 ENCOUNTER — OUTPATIENT (OUTPATIENT)
Dept: OUTPATIENT SERVICES | Facility: HOSPITAL | Age: 85
LOS: 1 days | End: 2025-01-17
Payer: MEDICARE

## 2025-01-17 VITALS
DIASTOLIC BLOOD PRESSURE: 72 MMHG | RESPIRATION RATE: 18 BRPM | OXYGEN SATURATION: 99 % | HEART RATE: 64 BPM | TEMPERATURE: 98 F | HEIGHT: 64 IN | WEIGHT: 107.14 LBS | SYSTOLIC BLOOD PRESSURE: 148 MMHG

## 2025-01-17 DIAGNOSIS — Z84.89 FAMILY HISTORY OF OTHER SPECIFIED CONDITIONS: ICD-10-CM

## 2025-01-17 DIAGNOSIS — N90.89 OTHER SPECIFIED NONINFLAMMATORY DISORDERS OF VULVA AND PERINEUM: ICD-10-CM

## 2025-01-17 DIAGNOSIS — Z01.818 ENCOUNTER FOR OTHER PREPROCEDURAL EXAMINATION: ICD-10-CM

## 2025-01-17 DIAGNOSIS — G47.00 INSOMNIA, UNSPECIFIED: ICD-10-CM

## 2025-01-17 DIAGNOSIS — Z98.890 OTHER SPECIFIED POSTPROCEDURAL STATES: Chronic | ICD-10-CM

## 2025-01-17 LAB
ANION GAP SERPL CALC-SCNC: 8 MMOL/L — SIGNIFICANT CHANGE UP (ref 5–17)
APPEARANCE UR: ABNORMAL
BACTERIA # UR AUTO: NEGATIVE /HPF — SIGNIFICANT CHANGE UP
BILIRUB UR-MCNC: NEGATIVE — SIGNIFICANT CHANGE UP
BUN SERPL-MCNC: 22 MG/DL — SIGNIFICANT CHANGE UP (ref 7–23)
CALCIUM SERPL-MCNC: 9.3 MG/DL — SIGNIFICANT CHANGE UP (ref 8.4–10.5)
CHLORIDE SERPL-SCNC: 104 MMOL/L — SIGNIFICANT CHANGE UP (ref 96–108)
CO2 SERPL-SCNC: 29 MMOL/L — SIGNIFICANT CHANGE UP (ref 22–31)
COLOR SPEC: YELLOW — SIGNIFICANT CHANGE UP
COMMENT - URINE: SIGNIFICANT CHANGE UP
CREAT SERPL-MCNC: 0.59 MG/DL — SIGNIFICANT CHANGE UP (ref 0.5–1.3)
DIFF PNL FLD: NEGATIVE — SIGNIFICANT CHANGE UP
EGFR: 89 ML/MIN/1.73M2 — SIGNIFICANT CHANGE UP
EPI CELLS # UR: PRESENT
GLUCOSE SERPL-MCNC: 83 MG/DL — SIGNIFICANT CHANGE UP (ref 70–99)
GLUCOSE UR QL: NEGATIVE MG/DL — SIGNIFICANT CHANGE UP
HCT VFR BLD CALC: 32.5 % — LOW (ref 34.5–45)
HGB BLD-MCNC: 10.8 G/DL — LOW (ref 11.5–15.5)
KETONES UR-MCNC: NEGATIVE MG/DL — SIGNIFICANT CHANGE UP
LEUKOCYTE ESTERASE UR-ACNC: ABNORMAL
MCHC RBC-ENTMCNC: 33.2 G/DL — SIGNIFICANT CHANGE UP (ref 32–36)
MCHC RBC-ENTMCNC: 34.4 PG — HIGH (ref 27–34)
MCV RBC AUTO: 103.5 FL — HIGH (ref 80–100)
NITRITE UR-MCNC: NEGATIVE — SIGNIFICANT CHANGE UP
NRBC # BLD: 0 /100 WBCS — SIGNIFICANT CHANGE UP (ref 0–0)
PH UR: 7 — SIGNIFICANT CHANGE UP (ref 5–8)
PLATELET # BLD AUTO: 186 K/UL — SIGNIFICANT CHANGE UP (ref 150–400)
POTASSIUM SERPL-MCNC: 4 MMOL/L — SIGNIFICANT CHANGE UP (ref 3.5–5.3)
POTASSIUM SERPL-SCNC: 4 MMOL/L — SIGNIFICANT CHANGE UP (ref 3.5–5.3)
PROT UR-MCNC: NEGATIVE MG/DL — SIGNIFICANT CHANGE UP
RBC # BLD: 3.14 M/UL — LOW (ref 3.8–5.2)
RBC # FLD: 15.9 % — HIGH (ref 10.3–14.5)
RBC CASTS # UR COMP ASSIST: 1 /HPF — SIGNIFICANT CHANGE UP (ref 0–4)
SODIUM SERPL-SCNC: 141 MMOL/L — SIGNIFICANT CHANGE UP (ref 135–145)
SP GR SPEC: 1.02 — SIGNIFICANT CHANGE UP (ref 1–1.03)
UROBILINOGEN FLD QL: 0.2 MG/DL — SIGNIFICANT CHANGE UP (ref 0.2–1)
WBC # BLD: 4.72 K/UL — SIGNIFICANT CHANGE UP (ref 3.8–10.5)
WBC # FLD AUTO: 4.72 K/UL — SIGNIFICANT CHANGE UP (ref 3.8–10.5)
WBC UR QL: 2 /HPF — SIGNIFICANT CHANGE UP (ref 0–5)

## 2025-01-17 PROCEDURE — 93010 ELECTROCARDIOGRAM REPORT: CPT | Mod: NC

## 2025-01-17 NOTE — H&P PST ADULT - HISTORY OF PRESENT ILLNESS
83y/o female with medical h/o IBS diet control, Insomnia-Anxiety, and Chronic UTI reports last UTI 1 month ago, reports Lichen sclerosis to vulva, presents today for PST for scheduled Wide Local Excision of Vulva on 1/24/25

## 2025-01-17 NOTE — H&P PST ADULT - PROBLEM SELECTOR PLAN 1
Pre-op instructions given. Pt verbalized understanding   Pt instructed to HOLD all NSAIDs/Herbal supplement/Multivitamins 7 days before surgery  Pending: labs + M/C - appt 1/22/25

## 2025-01-17 NOTE — H&P PST ADULT - NEGATIVE IMMUNOLOGICAL SYMPTOMS
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 He began Cycle 13 on COG study ADVL 1513 with Entinostat last week.     HPI:  Geo is doing well overall. Barium enema last week was normal. He didn't stool very well over the weekend, but it has been improved the past couple of days. Geo feels like his BM today was less in volume and \"mashed potato\" consistency. He is taking miralax generally once daily. No n/v. Energy level is good. Seasonal allergies are bothering him at times, allegra helps. Despite left eye redness, he has no eye drainage, pain or itchiness. No other c/o pain. No bleeding symptoms.     Fam/Soc: Lives between his  parents (one week at each home).  They have been awarded guardianship of Geo. The families work well together - both parents have remarried.  His dad has a clotting disorder, requiring him to take Warfarin daily.         Allergies   Allergen Reactions     Blood Transfusion Related (Informational Only) Swelling     Periorbital swelling post platelet transfusion     No Known Drug Allergies        Current Outpatient Prescriptions   Medication Sig Dispense Refill     calcium carbonate-vitamin D 600-400 MG-UNIT CHEW Take 2 tablets in the morning and 1 tablet in the evening. 90 tablet 3     Cholecalciferol 400 UNITS CHEW Take 1 tablet (400 Units) by mouth every morning 60 tablet 2     dexamethasone (DECADRON) 0.5 MG tablet TAKE 1.5 TABLETS (0.75 MG) BY MOUTH 5 days out of 7. 130 tablet 3     docusate sodium (COLACE) 100 MG capsule Take 1 capsule (100 mg) by mouth 2 times daily as needed for constipation 60 capsule 3     fexofenadine (ALLEGRA) 180 MG tablet Take 180 mg by mouth daily       fluticasone (FLONASE) 50 MCG/ACT spray Spray 1-2 sprays into both nostrils daily 1 " Bottle 11     glycopyrrolate (CUVPOSA) 1 MG/5ML solution Take 7.08 mLs (1,416 mcg) by mouth 3 times daily 637.2 mL 2     melatonin 3 MG tablet Take 3 mg by mouth At Bedtime       [START ON 6/13/2018] methylphenidate (METADATE CD) 30 MG CR capsule Take 1 capsule (30 mg) by mouth every morning 28 capsule 0     methylphenidate (RITALIN) 20 MG tablet Take 1 tablet (20 mg) by mouth daily 30 tablet 0     mupirocin (BACTROBAN) 2 % ointment Use 2 times a day to the buttock with flare 22 g 3     omeprazole (PRILOSEC) 20 MG CR capsule Take 1 capsule (20 mg) by mouth daily 90 capsule 2     ondansetron (ZOFRAN-ODT) 4 MG ODT tab Take 1 tablet (4 mg) by mouth every 8 hours as needed for nausea 5 tablet 0     pentoxifylline (TRENTAL) 400 MG CR tablet Take 1 tablet (400 mg) by mouth 3 times daily (with meals) 270 tablet 2     polyethylene glycol (MIRALAX/GLYCOLAX) packet Take 17 g by mouth daily as needed for constipation       potassium phosphate, monobasic, (K-PHOS) 500 MG tablet Take 1 tablet (500 mg) by mouth 3 times daily 90 tablet 3     study - entinostat (IDS# 5050) 1 mg tablet Take 1 tablet (1 mg) by mouth every 7 days for 4 doses Take one 1mg tablet with one 5mg tablet for total dose of 6mg weekly. Take on an empty stomach, at least 1 hour before or 2 hours after a meal.  Swallow tablet whole. 4 tablet 0     study - entinostat (IDS# 5050) 5 mg tablet Take 1 tablet (5 mg) by mouth every 7 days for 4 doses Take one 5mg tablet with one 1mg tablet for total dose of 6mg weekly. Take on an empty stomach, at least 1 hour before or 2 hours after a meal.  Swallow tablet whole. 4 tablet 0     sulfamethoxazole-trimethoprim (BACTRIM/SEPTRA) 400-80 MG per tablet Take 1 tablet by mouth 2 times daily On Saturdays and Sundays 24 tablet 11     vitamin E (GNP VITAMIN E) 400 UNIT capsule Take 1 capsule (400 Units) by mouth daily 30 capsule 11   Above meds reviewed. No missed doses of entinostat, will take dose today soon. Confirms taking on  empty stomach.      Past Medical History:   Diagnosis Date     Cranial nerve dysfunction      Dyspepsia      Ependymoma (H)      Gastro-oesophageal reflux disease      Hearing loss      Intracranial hemorrhage (H)      Migraine      Pilonidal cyst     7-2015     Reduced vision      Refractory obstruction of nasal airway     2nd to nasal valve prolapse     Sleep apnea      Strabismus     gaze palsy        Past Surgical History:   Procedure Laterality Date     GRAFT CARTILAGE FROM POSTERIOR AURICLE Left 10/6/2016    Procedure: GRAFT CARTILAGE FROM POSTERIOR AURICLE;  Surgeon: Tyler Richards MD;  Location: [facility]     INCISION AND DRAINAGE PERINEAL, COMBINED Bilateral 7/18/2015    Procedure: COMBINED INCISION AND DRAINAGE PERINEAL;  Surgeon: Dequan Timmons MD;  [facility]     OPTICAL TRACKING SYSTEM CRANIOTOMY, EXCISE TUMOR, COMBINED N/A 4/13/2015    Procedure: COMBINED OPTICAL TRACKING SYSTEM CRANIOTOMY, EXCISE TUMOR;  Surgeon: Francis Velazquez MD;  [facility]     OPTICAL TRACKING SYSTEM CRANIOTOMY, EXCISE TUMOR, COMBINED N/A 4/16/2015    Procedure: COMBINED OPTICAL TRACKING SYSTEM CRANIOTOMY, EXCISE TUMOR;  Surgeon: Francis Velazquez MD;  [facility]     OPTICAL TRACKING SYSTEM CRANIOTOMY, EXCISE TUMOR, COMBINED Bilateral 5/28/2015    Procedure: COMBINED OPTICAL TRACKING SYSTEM CRANIOTOMY, EXCISE TUMOR;  Surgeon: Francis Velazquez MD;  [facility]     OPTICAL TRACKING SYSTEM CRANIOTOMY, EXCISE TUMOR, COMBINED Bilateral 1/14/2016    Procedure: COMBINED OPTICAL TRACKING SYSTEM CRANIOTOMY, EXCISE TUMOR;  Surgeon: Francis Velazquez MD;  [facility]     OPTICAL TRACKING SYSTEM VENTRICULOSTOMY  4/16/2015    Procedure: OPTICAL TRACKING SYSTEM VENTRICULOSTOMY;  Surgeon: Francis Velazquez MD;  [facility]     REMOVE PORT VASCULAR ACCESS N/A 10/6/2016    Procedure: REMOVE PORT VASCULAR ACCESS;  Surgeon: Bruno Perea MD;  [facility]      RHINOPLASTY N/A 10/6/2016    Procedure: RHINOPLASTY;  Surgeon: Tyler Richards MD;  [facility]     VASCULAR SURGERY  5-2015    single lumen power port       Family History   Problem Relation Age of Onset     Circulatory Father      PE/DVT     Hypothyroidism Father 30     DIABETES Maternal Grandmother      DIABETES Paternal Grandmother      DIABETES Paternal Grandfather      C.A.D. Paternal Grandfather      Hypertension Maternal Grandfather      Thyroid Disease Paternal Aunt      unknown whether hypo or hyper       Review of Systems   Constitutional: Negative.  Negative for appetite change, fatigue and fever.        In a wheelchair      HENT: Positive for sneezing. Negative for drooling, nosebleeds, postnasal drip, rhinorrhea and trouble swallowing.         Uses allegra for Allergy symptoms.   Eyes: Positive for redness. Negative for pain, discharge and itching.        Left eye slightly erythematous. No vision changes.  Wears patch over one eye to minimize diploia.   Respiratory: Negative.  Negative for cough and chest tightness.         He had a sleep study last December (2016) with Dr. Moncada at [place] and more recently 4/19/17.  He has moderate obstructive sleep apnea and related hypoventilation effectively treated during the study with bilevel 18/11 with a back up rate  Of 12 breaths per minute and an inspiratory time of 1.2.  The family has been using [facility] [place] for their home care provider (now [facility]).  It was not set appropriately but was adjusted and now is in line with orders.   Cardiovascular: Negative.  Negative for palpitations.   Gastrointestinal: Positive for constipation. Negative for abdominal pain and blood in stool.   Endocrine:        Follows with Dr. Martin   Genitourinary: Negative.    Musculoskeletal: Negative.    Skin: Negative.  Negative for rash.   Neurological: Negative for headaches.   Psychiatric/Behavioral: Negative.    All other systems reviewed and are  negative.       /66 (BP Location: Left arm, Patient Position: Chair, Cuff Size: Adult Regular)  Pulse 74  Temp 96.8  F (36  C) (Axillary)  Resp 20  Wt 71.5 kg (157 lb 10.1 oz)  SpO2 98%  BMI 22.32 kg/m2      Wt Readings from Last 4 Encounters:   05/30/18 71.5 kg (157 lb 10.1 oz) (61 %)*   05/23/18 72 kg (158 lb 11.7 oz) (62 %)*   05/16/18 71.9 kg (158 lb 8 oz) (62 %)*   05/09/18 72.2 kg (159 lb 2.8 oz) (63 %)*     * Growth percentiles are based on CDC 2-20 Years data.     Physical Exam   Constitutional: He is oriented to person, place, and time.   Well-appearing, bright affect   HENT:   Head: Normocephalic and atraumatic.   Right Ear: External ear normal.   Left Ear: External ear normal.   Nose: Nose normal.   Mouth/Throat: Oropharynx is clear and moist.   No drooling noted. No mouth sores   Eyes: Pupils are equal, round, and reactive to light. Right eye exhibits no discharge. No scleral icterus.   Mild left conjunctiva injection, no drainage.   Right eye covered with patch.   Neck: Normal range of motion.   Cardiovascular: Normal rate, regular rhythm and normal heart sounds.    No murmur heard.  Pulmonary/Chest: Effort normal and breath sounds normal. No respiratory distress. He has no wheezes.   Abdominal: Soft. Bowel sounds are normal. He exhibits no distension and no mass. There is no tenderness.   Genitourinary:   Genitourinary Comments: deferred   Lymphadenopathy:     He has no cervical adenopathy.   Neurological: He is alert and oriented to person, place, and time. A cranial nerve deficit is present. Coordination abnormal.   Stands with assist. Ataxic. Dymetria especially in upper extremities when accomplishing tasks.    Skin: Skin is warm and dry. No rash noted.   Multiple bruises in different stages of healing on lower legs. Multiple striae. Right arm at AC with 4 mm tear on striae, evidence of healing by secondary intention, no drainage, erythema or warmth.   Psychiatric: Mood and affect  normal.       Labs:  Results for orders placed or performed in visit on 05/30/18   CBC with platelets differential   Result Value Ref Range    WBC 4.6 4.0 - 11.0 10e9/L    RBC Count 4.17 (L) 4.4 - 5.9 10e12/L    Hemoglobin 13.5 13.3 - 17.7 g/dL    Hematocrit 39.4 (L) 40.0 - 53.0 %    MCV 95 78 - 100 fl    MCH 32.4 26.5 - 33.0 pg    MCHC 34.3 31.5 - 36.5 g/dL    RDW 11.6 10.0 - 15.0 %    Platelet Count 121 (L) 150 - 450 10e9/L    Diff Method Automated Method     % Neutrophils 61.1 %    % Lymphocytes 13.8 %    % Monocytes 8.0 %    % Eosinophils 15.8 %    % Basophils 0.9 %    % Immature Granulocytes 0.4 %    Nucleated RBCs 0 0 /100    Absolute Neutrophil 2.8 1.6 - 8.3 10e9/L    Absolute Lymphocytes 0.6 (L) 0.8 - 5.3 10e9/L    Absolute Monocytes 0.4 0.0 - 1.3 10e9/L    Absolute Eosinophils 0.7 0.0 - 0.7 10e9/L    Absolute Basophils 0.0 0.0 - 0.2 10e9/L    Abs Immature Granulocytes 0.0 0 - 0.4 10e9/L    Absolute Nucleated RBC 0.0      *Note: Due to a large number of results and/or encounters for the requested time period, some results have not been displayed. A complete set of results can be found in Results Review.       Impression:  1. Ependymoma   2. Cycle 13, Day 8 today.    3. Platelet count downtrending, no bleeding, non-GOVIND  4. H/O known obstructive sleep apnea treated with BiPAP  5. H/O constipation       Plan:  1. Entinostat 6mg weekly.  This is his final study course.    2. Continue miralax daily with at least 8oz of water. Start colace daily. Family will call if stooling concerns   3. RTC next week for Day 15 exam/labs (CBCdp)   4. Continue BiPAP use.  5. He will have imaging following cycle 13 at the completion of study in June. Primary oncology team working on obtaining drug through compassionate use.             ALAN Ricketts CNP   no recurring infections/no persistent infections/no recurring pneumonia

## 2025-01-17 NOTE — H&P PST ADULT - NSICDXNOFAMILYHX_GEN_ALL_CORE
Picato Counseling:  I discussed with the patient the risks of Picato including but not limited to erythema, scaling, itching, weeping, crusting, and pain. <-- Click to add NO pertinent Family History

## 2025-01-17 NOTE — H&P PST ADULT - NSICDXPASTMEDICALHX_GEN_ALL_CORE_FT
PAST MEDICAL HISTORY:  Anxiety     Chronic UTI (urinary tract infection)     Family history of malignant hyperthermia     Insomnia     Irritable bowel syndrome, unspecified type     Lichen sclerosus

## 2025-01-17 NOTE — H&P PST ADULT - NSANTHOSAYNRD_GEN_A_CORE
neck 13.5inches/No. ANGELINA screening performed.  STOP BANG Legend: 0-2 = LOW Risk; 3-4 = INTERMEDIATE Risk; 5-8 = HIGH Risk

## 2025-01-18 LAB
CULTURE RESULTS: SIGNIFICANT CHANGE UP
SPECIMEN SOURCE: SIGNIFICANT CHANGE UP

## 2025-01-21 PROCEDURE — 87086 URINE CULTURE/COLONY COUNT: CPT

## 2025-01-21 PROCEDURE — 85027 COMPLETE CBC AUTOMATED: CPT

## 2025-01-21 PROCEDURE — 36415 COLL VENOUS BLD VENIPUNCTURE: CPT

## 2025-01-21 PROCEDURE — G0463: CPT

## 2025-01-21 PROCEDURE — 93005 ELECTROCARDIOGRAM TRACING: CPT

## 2025-01-21 PROCEDURE — 80048 BASIC METABOLIC PNL TOTAL CA: CPT

## 2025-01-21 PROCEDURE — 81001 URINALYSIS AUTO W/SCOPE: CPT

## 2025-01-22 ENCOUNTER — TRANSCRIPTION ENCOUNTER (OUTPATIENT)
Age: 85
End: 2025-01-22

## 2025-01-22 ENCOUNTER — APPOINTMENT (OUTPATIENT)
Dept: FAMILY MEDICINE | Facility: CLINIC | Age: 85
End: 2025-01-22
Payer: MEDICARE

## 2025-01-22 VITALS
WEIGHT: 109 LBS | HEART RATE: 56 BPM | TEMPERATURE: 97.5 F | RESPIRATION RATE: 14 BRPM | OXYGEN SATURATION: 100 % | HEIGHT: 65 IN | SYSTOLIC BLOOD PRESSURE: 122 MMHG | DIASTOLIC BLOOD PRESSURE: 62 MMHG | BODY MASS INDEX: 18.16 KG/M2

## 2025-01-22 DIAGNOSIS — Z01.818 ENCOUNTER FOR OTHER PREPROCEDURAL EXAMINATION: ICD-10-CM

## 2025-01-22 DIAGNOSIS — N90.3 DYSPLASIA OF VULVA, UNSPECIFIED: ICD-10-CM

## 2025-01-22 PROBLEM — L90.0 LICHEN SCLEROSUS ET ATROPHICUS: Chronic | Status: ACTIVE | Noted: 2025-01-17

## 2025-01-22 PROBLEM — N39.0 URINARY TRACT INFECTION, SITE NOT SPECIFIED: Chronic | Status: ACTIVE | Noted: 2025-01-17

## 2025-01-22 PROBLEM — Z84.89 FAMILY HISTORY OF OTHER SPECIFIED CONDITIONS: Chronic | Status: ACTIVE | Noted: 2025-01-17

## 2025-01-22 PROBLEM — G47.00 INSOMNIA, UNSPECIFIED: Chronic | Status: ACTIVE | Noted: 2025-01-17

## 2025-01-22 PROCEDURE — 99214 OFFICE O/P EST MOD 30 MIN: CPT

## 2025-01-24 ENCOUNTER — TRANSCRIPTION ENCOUNTER (OUTPATIENT)
Age: 85
End: 2025-01-24

## 2025-01-24 ENCOUNTER — OUTPATIENT (OUTPATIENT)
Dept: OUTPATIENT SERVICES | Facility: HOSPITAL | Age: 85
LOS: 1 days | End: 2025-01-24
Payer: MEDICARE

## 2025-01-24 ENCOUNTER — APPOINTMENT (OUTPATIENT)
Dept: GYNECOLOGIC ONCOLOGY | Facility: HOSPITAL | Age: 85
End: 2025-01-24

## 2025-01-24 VITALS
HEART RATE: 60 BPM | HEIGHT: 64 IN | RESPIRATION RATE: 14 BRPM | WEIGHT: 107.14 LBS | DIASTOLIC BLOOD PRESSURE: 69 MMHG | TEMPERATURE: 98 F | OXYGEN SATURATION: 98 % | SYSTOLIC BLOOD PRESSURE: 124 MMHG

## 2025-01-24 VITALS
DIASTOLIC BLOOD PRESSURE: 63 MMHG | RESPIRATION RATE: 12 BRPM | TEMPERATURE: 97 F | SYSTOLIC BLOOD PRESSURE: 129 MMHG | HEART RATE: 57 BPM | OXYGEN SATURATION: 96 %

## 2025-01-24 DIAGNOSIS — Z98.891 HISTORY OF UTERINE SCAR FROM PREVIOUS SURGERY: Chronic | ICD-10-CM

## 2025-01-24 DIAGNOSIS — N90.89 OTHER SPECIFIED NONINFLAMMATORY DISORDERS OF VULVA AND PERINEUM: ICD-10-CM

## 2025-01-24 DIAGNOSIS — Z98.890 OTHER SPECIFIED POSTPROCEDURAL STATES: Chronic | ICD-10-CM

## 2025-01-24 PROCEDURE — 56620 VULVECTOMY SIMPLE PARTIAL: CPT

## 2025-01-24 PROCEDURE — 12042 INTMD RPR N-HF/GENIT2.6-7.5: CPT

## 2025-01-24 PROCEDURE — 11621 EXC S/N/H/F/G MAL+MRG 0.6-1: CPT

## 2025-01-24 PROCEDURE — 88305 TISSUE EXAM BY PATHOLOGIST: CPT | Mod: 26

## 2025-01-24 PROCEDURE — 88305 TISSUE EXAM BY PATHOLOGIST: CPT

## 2025-01-24 RX ORDER — ESTRADIOL 0.07 MG/D
1 PATCH, EXTENDED RELEASE TRANSDERMAL
Refills: 0 | DISCHARGE

## 2025-01-24 RX ORDER — SODIUM CHLORIDE 9 G/ML
1000 INJECTION, SOLUTION INTRAVENOUS
Refills: 0 | Status: DISCONTINUED | OUTPATIENT
Start: 2025-01-24 | End: 2025-01-24

## 2025-01-24 RX ORDER — HYDROMORPHONE HYDROCHLORIDE 4 MG/ML
0.5 INJECTION, SOLUTION INTRAMUSCULAR; INTRAVENOUS; SUBCUTANEOUS
Refills: 0 | Status: DISCONTINUED | OUTPATIENT
Start: 2025-01-24 | End: 2025-01-24

## 2025-01-24 RX ORDER — CLOBETASOL PROPIONATE 0.5 MG/G
1 CREAM TOPICAL
Refills: 0 | DISCHARGE

## 2025-01-24 RX ORDER — TRAMADOL HYDROCHLORIDE 100 MG/1
25 TABLET, EXTENDED RELEASE ORAL ONCE
Refills: 0 | Status: DISCONTINUED | OUTPATIENT
Start: 2025-01-24 | End: 2025-01-24

## 2025-01-24 RX ORDER — ONDANSETRON 4 MG/1
4 TABLET, ORALLY DISINTEGRATING ORAL ONCE
Refills: 0 | Status: DISCONTINUED | OUTPATIENT
Start: 2025-01-24 | End: 2025-01-24

## 2025-01-24 RX ORDER — AZELASTINE HYDROCHLORIDE 137 UG/1
1 SPRAY, METERED NASAL
Refills: 0 | DISCHARGE

## 2025-01-24 RX ADMIN — SODIUM CHLORIDE 50 MILLILITER(S): 9 INJECTION, SOLUTION INTRAVENOUS at 10:34

## 2025-01-24 NOTE — ASU DISCHARGE PLAN (ADULT/PEDIATRIC) - FINANCIAL ASSISTANCE
[facility] provides services at a reduced cost to those who are determined to be eligible through [facility]’s financial assistance program. Information regarding [facility]’s financial assistance program can be found by going to [email or web address] or by calling [phone].

## 2025-01-24 NOTE — ASU DISCHARGE PLAN (ADULT/PEDIATRIC) - CARE PROVIDER_API CALL
Michelle Schrader  Gynecologic Oncology  [address] [place] [id number]  Phone: [phone]  Fax: [phone]  Follow Up Time: 2 weeks

## 2025-01-24 NOTE — ASU PATIENT PROFILE, ADULT - FALL HARM RISK - UNIVERSAL INTERVENTIONS
Bed in lowest position, wheels locked, appropriate side rails in place/Call bell, personal items and telephone in reach/Instruct patient to call for assistance before getting out of bed or chair/Non-slip footwear when patient is out of bed/[place] to call system/Physically safe environment - no spills, clutter or unnecessary equipment/Purposeful Proactive Rounding/Room/bathroom lighting operational, light cord in reach

## 2025-01-24 NOTE — ASU DISCHARGE PLAN (ADULT/PEDIATRIC) - NS MD DC FALL RISK RISK
For information on Fall & Injury Prevention, visit: [email or web address] OR  [email or web address] OR  https://www.cdc.gov/steadi/patient.html

## 2025-01-24 NOTE — BRIEF OPERATIVE NOTE - NSICDXBRIEFPROCEDURE_GEN_ALL_CORE_FT
PROCEDURES:  Excision, nodule, vulva, labia 24-Jan-2025 13:18:37 left vulvar lesion Ileana Corrales

## 2025-01-24 NOTE — ASU PATIENT PROFILE, ADULT - NSICDXPASTMEDICALHX_GEN_ALL_CORE_FT
PAST MEDICAL HISTORY:  Anxiety     Chronic UTI (urinary tract infection)     Family history of malignant hyperthermia     Insomnia     Irritable bowel syndrome, unspecified type     Lichen sclerosus      PAST MEDICAL HISTORY:  Anxiety     Chronic UTI (urinary tract infection)     Family history of malignant hyperthermia     H/O Clostridium difficile infection     Insomnia     Irritable bowel syndrome, unspecified type     Lichen sclerosus

## 2025-01-24 NOTE — ASU PATIENT PROFILE, ADULT - NS SC CAGE ALCOHOL CUT DOWN
Shant Mota is calling to request a refill on the following medication(s):    Medication Request:  Requested Prescriptions     Pending Prescriptions Disp Refills    vitamin D (ERGOCALCIFEROL) 1.25 MG (57733 UT) CAPS capsule [Pharmacy Med Name: VITAMIN D2 50,000IU (ERGO) CAP RX] 12 capsule 0     Sig: TAKE 1 CAPSULE BY MOUTH 1 TIME A WEEK       Last Visit Date (If Applicable):  8/38/1028    Next Visit Date:    6/7/2022 no

## 2025-01-28 PROBLEM — Z86.19 PERSONAL HISTORY OF OTHER INFECTIOUS AND PARASITIC DISEASES: Chronic | Status: ACTIVE | Noted: 2025-01-24

## 2025-01-29 ENCOUNTER — NON-APPOINTMENT (OUTPATIENT)
Age: 85
End: 2025-01-29

## 2025-01-30 ENCOUNTER — TRANSCRIPTION ENCOUNTER (OUTPATIENT)
Age: 85
End: 2025-01-30

## 2025-02-03 ENCOUNTER — TRANSCRIPTION ENCOUNTER (OUTPATIENT)
Age: 85
End: 2025-02-03

## 2025-02-04 ENCOUNTER — APPOINTMENT (OUTPATIENT)
Dept: CT IMAGING | Facility: HOSPITAL | Age: 85
End: 2025-02-04

## 2025-02-04 ENCOUNTER — OUTPATIENT (OUTPATIENT)
Dept: OUTPATIENT SERVICES | Facility: HOSPITAL | Age: 85
LOS: 1 days | End: 2025-02-04
Payer: MEDICARE

## 2025-02-04 DIAGNOSIS — Z98.890 OTHER SPECIFIED POSTPROCEDURAL STATES: Chronic | ICD-10-CM

## 2025-02-04 DIAGNOSIS — N30.90 CYSTITIS, UNSPECIFIED WITHOUT HEMATURIA: ICD-10-CM

## 2025-02-04 DIAGNOSIS — Z98.891 HISTORY OF UTERINE SCAR FROM PREVIOUS SURGERY: Chronic | ICD-10-CM

## 2025-02-04 PROCEDURE — 74176 CT ABD & PELVIS W/O CONTRAST: CPT | Mod: 26

## 2025-02-04 PROCEDURE — G1002: CPT

## 2025-02-04 PROCEDURE — 74176 CT ABD & PELVIS W/O CONTRAST: CPT | Mod: MG

## 2025-02-05 ENCOUNTER — APPOINTMENT (OUTPATIENT)
Dept: OTOLARYNGOLOGY | Facility: CLINIC | Age: 85
End: 2025-02-05
Payer: MEDICARE

## 2025-02-05 VITALS — BODY MASS INDEX: 18.16 KG/M2 | TEMPERATURE: 97.7 F | HEIGHT: 65 IN | WEIGHT: 109 LBS

## 2025-02-05 DIAGNOSIS — H61.23 IMPACTED CERUMEN, BILATERAL: ICD-10-CM

## 2025-02-05 DIAGNOSIS — H90.3 SENSORINEURAL HEARING LOSS, BILATERAL: ICD-10-CM

## 2025-02-05 PROCEDURE — 69210 REMOVE IMPACTED EAR WAX UNI: CPT

## 2025-02-05 PROCEDURE — 99213 OFFICE O/P EST LOW 20 MIN: CPT | Mod: 25

## 2025-02-06 ENCOUNTER — NON-APPOINTMENT (OUTPATIENT)
Age: 85
End: 2025-02-06

## 2025-02-06 LAB — SURGICAL PATHOLOGY STUDY: SIGNIFICANT CHANGE UP

## 2025-02-07 ENCOUNTER — TRANSCRIPTION ENCOUNTER (OUTPATIENT)
Age: 85
End: 2025-02-07

## 2025-02-07 NOTE — ED PROVIDER NOTE - WR ORDER NAME 1
PROGRESS NOTE         Darby Beltran is a 74 year old here for Follow-up and Office Visit   The patient presents for evaluation of skin tag, dry eyes, rheumatoid arthritis, anemia, and health maintenance.    She has been experiencing persistent dry eyes, which are particularly bothersome during the day. Over the past few weeks, she has noticed an increase in stickiness, especially overnight. She reports no crusting or pain in the eyelids. She is uncertain if this issue requires consultation with an ophthalmologist or if it can be managed with over-the-counter treatments. She is aware that her autoimmune condition could potentially contribute to this symptom. She uses Refresh Tears once daily for relief. She had an eye examination last year, which did not reveal any significant issues.    She has identified a skin tag on her body, which is currently asymptomatic. She is considering seeking a referral for its removal.    She maintains an active lifestyle, engaging in regular walking exercises with her , along with stretching, weightlifting, and balance exercises. She utilizes a Fitbit device to monitor her heart rate, which typically ranges between 59 and 60 at rest. During physical activity, her heart rate increases to an average of 111. She is seeking advice on maintaining a healthy heart rate during exercise. She is not doing anything super aerobic. She has a history of tachycardia.    She has been anemic for a while.    She is on methotrexate and folic acid for rheumatoid arthritis. She is not experiencing any pain and her medications are working well for her.    She had a mammogram in August. She had a colonoscopy on 11/08/2019 by Dr. Pineda at [facility]. She has no problems with her stools.    SOCIAL HISTORY  She never smokes.    MEDICATIONS  atorvastatin, methotrexate, folic acid    IMMUNIZATIONS  She has not received the shingles and RSV vaccines.    Review of Systems   Constitutional: Negative.     HENT: Negative.     Eyes:         Per hpi   Respiratory: Negative.     Cardiovascular: Negative.    Skin:         Per hpi         SDOH Never Smoker          Objective   Vitals:    02/07/25 1054   BP: 128/80   Pulse: 70   Resp: 14   Temp: 96.7 °F (35.9 °C)   TempSrc: Temporal   SpO2: 100%   Weight: 70.6 kg (155 lb 11.2 oz)   Height: 5' 2\" (1.575 m)   PainSc:  0   BMI (Calculated): 28.48     Physical Exam  Vitals reviewed.   HENT:      Head: Normocephalic.   Eyes:      Conjunctiva/sclera: Conjunctivae normal.   Cardiovascular:      Rate and Rhythm: Normal rate and regular rhythm.      Pulses: Normal pulses.      Heart sounds: Normal heart sounds.   Pulmonary:      Effort: Pulmonary effort is normal.      Breath sounds: Normal breath sounds.   Skin:            Comments: Large skin tag   Neurological:      Mental Status: She is alert.             Lungs are clear.    Vital Signs  Blood pressure is normal.     ASSESSMENT AND PLAN        1. Health Maintenance.  Her blood pressure readings are within the normal range. She has not yet completed her shingles and RSV vaccinations. Her cholesterol levels are satisfactory, and her thyroid function, assessed in August, was also within normal limits. Given these findings, there is no immediate need for additional laboratory tests. She is advised to consult with her rheumatologist regarding the RSV vaccine. A DEXA scan will be ordered due to its being due. A request for her colonoscopy report from [facility] will be made.    2. Skin Tag.  She presents with a large skin tag, which does not exhibit any signs of malignancy. A referral to dermatology will be made for further evaluation and potential removal of the skin tag.    3. Dry Eyes.  She reports experiencing dry eyes, which could potentially be a symptom of her autoimmune condition. She is advised to use preservative-free lubricant drops nightly and in the morning. Additionally, she should cleanse her eyelids  with baby soap before applying the drops and going to sleep, and repeat this process in the morning. If these measures do not alleviate her symptoms, a referral to ophthalmology will be considered.    4. Rheumatoid Arthritis.  She is currently managing her rheumatoid arthritis with methotrexate and folic acid. She reports that her medications are effective, and she remains active without significant pain.    5. Anemia.  She has been anemic for a while. No new labs are needed at this time as recent tests showed stable results.    6. Tachycardia.  Her resting heart rate is commendable, and her target heart rate during exercise should be target up to 150.    PROCEDURE  Colonoscopy was performed on 11/08/2019.will ask for records    1. Menopause  -     BD DEXA SCAN AXIAL SKELETON; Future  2. Essential hypertension-normotensive  3. Dyslipidemia-continue statin  4. Skin tag-refer to derm for removal   5. Dry eyes-start preservative free lubricant drops  6. RA-per rheum             Xray Chest 1 View AP/PA

## 2025-02-12 ENCOUNTER — APPOINTMENT (OUTPATIENT)
Dept: GYNECOLOGIC ONCOLOGY | Facility: CLINIC | Age: 85
End: 2025-02-12
Payer: MEDICARE

## 2025-02-12 VITALS
TEMPERATURE: 98 F | HEART RATE: 62 BPM | HEIGHT: 65 IN | BODY MASS INDEX: 18.16 KG/M2 | OXYGEN SATURATION: 98 % | WEIGHT: 109 LBS | SYSTOLIC BLOOD PRESSURE: 124 MMHG | DIASTOLIC BLOOD PRESSURE: 50 MMHG

## 2025-02-12 DIAGNOSIS — R93.89 ABNORMAL FINDINGS ON DIAGNOSTIC IMAGING OF OTHER SPECIFIED BODY STRUCTURES: ICD-10-CM

## 2025-02-12 DIAGNOSIS — C51.9 MALIGNANT NEOPLASM OF VULVA, UNSPECIFIED: ICD-10-CM

## 2025-02-12 DIAGNOSIS — Z87.412 PERSONAL HISTORY OF VULVAR DYSPLASIA: ICD-10-CM

## 2025-02-12 PROCEDURE — 99024 POSTOP FOLLOW-UP VISIT: CPT

## 2025-02-14 ENCOUNTER — TRANSCRIPTION ENCOUNTER (OUTPATIENT)
Age: 85
End: 2025-02-14

## 2025-02-15 ENCOUNTER — TRANSCRIPTION ENCOUNTER (OUTPATIENT)
Age: 85
End: 2025-02-15

## 2025-02-18 ENCOUNTER — APPOINTMENT (OUTPATIENT)
Dept: SURGERY | Facility: CLINIC | Age: 85
End: 2025-02-18
Payer: MEDICARE

## 2025-02-18 VITALS
HEART RATE: 62 BPM | TEMPERATURE: 97.8 F | OXYGEN SATURATION: 97 % | RESPIRATION RATE: 16 BRPM | BODY MASS INDEX: 18.16 KG/M2 | DIASTOLIC BLOOD PRESSURE: 68 MMHG | SYSTOLIC BLOOD PRESSURE: 116 MMHG | WEIGHT: 109 LBS | HEIGHT: 65 IN

## 2025-02-18 DIAGNOSIS — K40.90 UNILATERAL INGUINAL HERNIA, W/OUT OBSTRUCTION OR GANGRENE, NOT SPECIFIED AS RECURRENT: ICD-10-CM

## 2025-02-18 DIAGNOSIS — K58.9 IRRITABLE BOWEL SYNDROME, UNSPECIFIED: ICD-10-CM

## 2025-02-18 PROCEDURE — 99205 OFFICE O/P NEW HI 60 MIN: CPT

## 2025-02-19 ENCOUNTER — NON-APPOINTMENT (OUTPATIENT)
Age: 85
End: 2025-02-19

## 2025-02-19 PROBLEM — K40.90 RIGHT INGUINAL HERNIA: Status: ACTIVE | Noted: 2025-02-19

## 2025-02-20 ENCOUNTER — NON-APPOINTMENT (OUTPATIENT)
Age: 85
End: 2025-02-20

## 2025-02-21 ENCOUNTER — TRANSCRIPTION ENCOUNTER (OUTPATIENT)
Age: 85
End: 2025-02-21

## 2025-02-21 ENCOUNTER — OUTPATIENT (OUTPATIENT)
Dept: OUTPATIENT SERVICES | Facility: HOSPITAL | Age: 85
LOS: 1 days | End: 2025-02-21
Payer: MEDICARE

## 2025-02-21 VITALS
HEIGHT: 65 IN | HEART RATE: 60 BPM | WEIGHT: 110.23 LBS | RESPIRATION RATE: 15 BRPM | SYSTOLIC BLOOD PRESSURE: 120 MMHG | DIASTOLIC BLOOD PRESSURE: 54 MMHG | TEMPERATURE: 99 F | OXYGEN SATURATION: 99 %

## 2025-02-21 DIAGNOSIS — Z01.818 ENCOUNTER FOR OTHER PREPROCEDURAL EXAMINATION: ICD-10-CM

## 2025-02-21 DIAGNOSIS — Z98.890 OTHER SPECIFIED POSTPROCEDURAL STATES: Chronic | ICD-10-CM

## 2025-02-21 DIAGNOSIS — K40.00 BILATERAL INGUINAL HERNIA, WITH OBSTRUCTION, WITHOUT GANGRENE, NOT SPECIFIED AS RECURRENT: ICD-10-CM

## 2025-02-21 DIAGNOSIS — K40.90 UNILATERAL INGUINAL HERNIA, WITHOUT OBSTRUCTION OR GANGRENE, NOT SPECIFIED AS RECURRENT: ICD-10-CM

## 2025-02-21 DIAGNOSIS — Z98.891 HISTORY OF UTERINE SCAR FROM PREVIOUS SURGERY: Chronic | ICD-10-CM

## 2025-02-21 DIAGNOSIS — Z98.41 CATARACT EXTRACTION STATUS, RIGHT EYE: Chronic | ICD-10-CM

## 2025-02-21 LAB
ALBUMIN SERPL ELPH-MCNC: 3.7 G/DL — SIGNIFICANT CHANGE UP (ref 3.3–5)
ALP SERPL-CCNC: 35 U/L — LOW (ref 40–120)
ALT FLD-CCNC: 23 U/L — SIGNIFICANT CHANGE UP (ref 10–45)
ANION GAP SERPL CALC-SCNC: 9 MMOL/L — SIGNIFICANT CHANGE UP (ref 5–17)
AST SERPL-CCNC: 13 U/L — SIGNIFICANT CHANGE UP (ref 10–40)
BILIRUB SERPL-MCNC: 0.6 MG/DL — SIGNIFICANT CHANGE UP (ref 0.2–1.2)
BLD GP AB SCN SERPL QL: SIGNIFICANT CHANGE UP
BUN SERPL-MCNC: 18 MG/DL — SIGNIFICANT CHANGE UP (ref 7–23)
CALCIUM SERPL-MCNC: 9.2 MG/DL — SIGNIFICANT CHANGE UP (ref 8.4–10.5)
CHLORIDE SERPL-SCNC: 108 MMOL/L — SIGNIFICANT CHANGE UP (ref 96–108)
CO2 SERPL-SCNC: 29 MMOL/L — SIGNIFICANT CHANGE UP (ref 22–31)
CREAT SERPL-MCNC: 0.57 MG/DL — SIGNIFICANT CHANGE UP (ref 0.5–1.3)
EGFR: 90 ML/MIN/1.73M2 — SIGNIFICANT CHANGE UP
GLUCOSE SERPL-MCNC: 86 MG/DL — SIGNIFICANT CHANGE UP (ref 70–99)
HCT VFR BLD CALC: 30.4 % — LOW (ref 34.5–45)
HGB BLD-MCNC: 10.4 G/DL — LOW (ref 11.5–15.5)
MCHC RBC-ENTMCNC: 34.2 G/DL — SIGNIFICANT CHANGE UP (ref 32–36)
MCHC RBC-ENTMCNC: 35.5 PG — HIGH (ref 27–34)
MCV RBC AUTO: 103.8 FL — HIGH (ref 80–100)
NRBC BLD AUTO-RTO: 0 /100 WBCS — SIGNIFICANT CHANGE UP (ref 0–0)
PLATELET # BLD AUTO: 177 K/UL — SIGNIFICANT CHANGE UP (ref 150–400)
POTASSIUM SERPL-MCNC: 4.1 MMOL/L — SIGNIFICANT CHANGE UP (ref 3.5–5.3)
POTASSIUM SERPL-SCNC: 4.1 MMOL/L — SIGNIFICANT CHANGE UP (ref 3.5–5.3)
PROT SERPL-MCNC: 6.9 G/DL — SIGNIFICANT CHANGE UP (ref 6–8.3)
RBC # BLD: 2.93 M/UL — LOW (ref 3.8–5.2)
RBC # FLD: 15.9 % — HIGH (ref 10.3–14.5)
SODIUM SERPL-SCNC: 146 MMOL/L — HIGH (ref 135–145)
WBC # BLD: 4.84 K/UL — SIGNIFICANT CHANGE UP (ref 3.8–10.5)
WBC # FLD AUTO: 4.84 K/UL — SIGNIFICANT CHANGE UP (ref 3.8–10.5)

## 2025-02-21 PROCEDURE — 85027 COMPLETE CBC AUTOMATED: CPT

## 2025-02-21 PROCEDURE — G0463: CPT

## 2025-02-21 PROCEDURE — 86901 BLOOD TYPING SEROLOGIC RH(D): CPT

## 2025-02-21 PROCEDURE — 80053 COMPREHEN METABOLIC PANEL: CPT

## 2025-02-21 PROCEDURE — 36415 COLL VENOUS BLD VENIPUNCTURE: CPT

## 2025-02-21 PROCEDURE — 86900 BLOOD TYPING SEROLOGIC ABO: CPT

## 2025-02-21 PROCEDURE — 86850 RBC ANTIBODY SCREEN: CPT

## 2025-02-21 NOTE — H&P PST ADULT - NSICDXPASTSURGICALHX_GEN_ALL_CORE_FT
PAST SURGICAL HISTORY:  H/O bilateral cataract extraction     H/O:  x2    History of vaginal surgery     S/P correction of deviated nasal septum

## 2025-02-21 NOTE — H&P PST ADULT - BP NONINVASIVE MEAN (MM HG)
Pt denies any c/o of dizziness at this time   Pt appears to be resting comfortably in bed     Marian Saez RN  08/18/17 [id number]

## 2025-02-21 NOTE — H&P PST ADULT - NSICDXPROCEDURE_GEN_ALL_CORE_FT
PROCEDURES:  Robot-assisted laparoscopic herniorrhaphy of right inguinal hernia 21-Feb-2025 08:13:01  Juana Scales

## 2025-02-21 NOTE — H&P PST ADULT - NSICDXFAMILYHX_GEN_ALL_CORE_FT
FAMILY HISTORY:  Father  Still living? Unknown  Family history of heart attack, Age at diagnosis: Age Unknown    Mother  Still living? No  Family history of breast cancer, Age at diagnosis: Age Unknown  Family history of heart attack, Age at diagnosis: Age Unknown    Sibling  Still living? Yes, Estimated age: Age Unknown  Family history of Alzheimer disease, Age at diagnosis: Age Unknown    Child  Still living? Unknown  Family history of malignant hyperthermia, Age at diagnosis: Age Unknown

## 2025-02-21 NOTE — H&P PST ADULT - NSICDXPASTMEDICALHX_GEN_ALL_CORE_FT
PAST MEDICAL HISTORY:  Anxiety     Chronic UTI (urinary tract infection)     Family history of malignant hyperthermia     H/O Clostridium difficile infection     Hearing loss     Insomnia     Irritable bowel syndrome, unspecified type     Lichen sclerosus     Right inguinal hernia

## 2025-02-21 NOTE — H&P PST ADULT - HISTORY OF PRESENT ILLNESS
84 yo female reports right inguinal hernia with discomfort for last 3 months.   She is scheduled for robotic repair of right inguinal hernia with mesh on 2/28/2025

## 2025-02-24 PROBLEM — K40.90 UNILATERAL INGUINAL HERNIA, WITHOUT OBSTRUCTION OR GANGRENE, NOT SPECIFIED AS RECURRENT: Chronic | Status: ACTIVE | Noted: 2025-02-21

## 2025-02-24 PROBLEM — H91.90 UNSPECIFIED HEARING LOSS, UNSPECIFIED EAR: Chronic | Status: ACTIVE | Noted: 2025-02-21

## 2025-02-25 ENCOUNTER — APPOINTMENT (OUTPATIENT)
Dept: FAMILY MEDICINE | Facility: CLINIC | Age: 85
End: 2025-02-25
Payer: MEDICARE

## 2025-02-25 VITALS
OXYGEN SATURATION: 98 % | RESPIRATION RATE: 16 BRPM | DIASTOLIC BLOOD PRESSURE: 64 MMHG | TEMPERATURE: 98 F | HEART RATE: 57 BPM | SYSTOLIC BLOOD PRESSURE: 125 MMHG

## 2025-02-25 DIAGNOSIS — L90.0 LICHEN SCLEROSUS ET ATROPHICUS: ICD-10-CM

## 2025-02-25 DIAGNOSIS — D46.9 MYELODYSPLASTIC SYNDROME, UNSPECIFIED: ICD-10-CM

## 2025-02-25 DIAGNOSIS — C51.9 MALIGNANT NEOPLASM OF VULVA, UNSPECIFIED: ICD-10-CM

## 2025-02-25 DIAGNOSIS — M47.812 SPONDYLOSIS W/OUT MYELOPATHY OR RADICULOPATHY, CERVICAL REGION: ICD-10-CM

## 2025-02-25 DIAGNOSIS — Z01.818 ENCOUNTER FOR OTHER PREPROCEDURAL EXAMINATION: ICD-10-CM

## 2025-02-25 PROCEDURE — 99214 OFFICE O/P EST MOD 30 MIN: CPT

## 2025-02-28 ENCOUNTER — TRANSCRIPTION ENCOUNTER (OUTPATIENT)
Age: 85
End: 2025-02-28

## 2025-02-28 ENCOUNTER — OUTPATIENT (OUTPATIENT)
Dept: OUTPATIENT SERVICES | Facility: HOSPITAL | Age: 85
LOS: 1 days | End: 2025-02-28
Payer: MEDICARE

## 2025-02-28 ENCOUNTER — APPOINTMENT (OUTPATIENT)
Dept: SURGERY | Facility: HOSPITAL | Age: 85
End: 2025-02-28

## 2025-02-28 VITALS
WEIGHT: 110.23 LBS | SYSTOLIC BLOOD PRESSURE: 114 MMHG | TEMPERATURE: 96 F | OXYGEN SATURATION: 97 % | HEART RATE: 94 BPM | RESPIRATION RATE: 14 BRPM | HEIGHT: 65 IN | DIASTOLIC BLOOD PRESSURE: 56 MMHG

## 2025-02-28 VITALS
OXYGEN SATURATION: 97 % | RESPIRATION RATE: 15 BRPM | DIASTOLIC BLOOD PRESSURE: 68 MMHG | HEART RATE: 59 BPM | SYSTOLIC BLOOD PRESSURE: 112 MMHG | TEMPERATURE: 98 F

## 2025-02-28 DIAGNOSIS — K40.00 BILATERAL INGUINAL HERNIA, WITH OBSTRUCTION, WITHOUT GANGRENE, NOT SPECIFIED AS RECURRENT: ICD-10-CM

## 2025-02-28 DIAGNOSIS — Z98.890 OTHER SPECIFIED POSTPROCEDURAL STATES: Chronic | ICD-10-CM

## 2025-02-28 DIAGNOSIS — Z98.41 CATARACT EXTRACTION STATUS, RIGHT EYE: Chronic | ICD-10-CM

## 2025-02-28 DIAGNOSIS — Z98.891 HISTORY OF UTERINE SCAR FROM PREVIOUS SURGERY: Chronic | ICD-10-CM

## 2025-02-28 LAB — ABO RH CONFIRMATION: SIGNIFICANT CHANGE UP

## 2025-02-28 PROCEDURE — 49650 LAP ING HERNIA REPAIR INIT: CPT | Mod: RT

## 2025-02-28 PROCEDURE — 36415 COLL VENOUS BLD VENIPUNCTURE: CPT

## 2025-02-28 PROCEDURE — C1781: CPT

## 2025-02-28 PROCEDURE — 49651 LAP ING HERNIA REPAIR RECUR: CPT | Mod: AS,RT

## 2025-02-28 PROCEDURE — C9399: CPT

## 2025-02-28 DEVICE — MESH HERNIA INGUINAL PROGRIP LAPAROSCOPIC RIGHT: Type: IMPLANTABLE DEVICE | Site: RIGHT | Status: FUNCTIONAL

## 2025-02-28 RX ORDER — METHENAMINE MANDELATE 1 G
1 TABLET ORAL
Refills: 0 | DISCHARGE

## 2025-02-28 RX ORDER — SODIUM CHLORIDE 9 G/1000ML
1000 INJECTION, SOLUTION INTRAVENOUS
Refills: 0 | Status: ACTIVE | OUTPATIENT
Start: 2025-02-28 | End: 2026-01-27

## 2025-02-28 RX ORDER — OXYCODONE HYDROCHLORIDE 30 MG/1
1 TABLET ORAL
Qty: 10 | Refills: 0
Start: 2025-02-28

## 2025-02-28 RX ORDER — CYCLOSPORINE OPHTHALMIC SOLUTION 1 MG/ML
0 SOLUTION/ DROPS OPHTHALMIC
Refills: 0 | DISCHARGE

## 2025-02-28 RX ORDER — ONDANSETRON HCL/PF 4 MG/2 ML
4 VIAL (ML) INJECTION ONCE
Refills: 0 | Status: DISCONTINUED | OUTPATIENT
Start: 2025-02-28 | End: 2025-02-28

## 2025-02-28 RX ORDER — OXYCODONE HYDROCHLORIDE 30 MG/1
5 TABLET ORAL ONCE
Refills: 0 | Status: DISCONTINUED | OUTPATIENT
Start: 2025-02-28 | End: 2025-02-28

## 2025-02-28 RX ORDER — HYDROMORPHONE/SOD CHLOR,ISO/PF 2 MG/10 ML
0.5 SYRINGE (ML) INJECTION
Refills: 0 | Status: DISCONTINUED | OUTPATIENT
Start: 2025-02-28 | End: 2025-02-28

## 2025-02-28 RX ORDER — HYDROMORPHONE/SOD CHLOR,ISO/PF 2 MG/10 ML
1 SYRINGE (ML) INJECTION
Refills: 0 | Status: DISCONTINUED | OUTPATIENT
Start: 2025-02-28 | End: 2025-02-28

## 2025-02-28 RX ORDER — NALOXONE HYDROCHLORIDE 0.4 MG/ML
1 INJECTION, SOLUTION INTRAMUSCULAR; INTRAVENOUS; SUBCUTANEOUS
Qty: 1 | Refills: 0
Start: 2025-02-28

## 2025-02-28 RX ADMIN — OXYCODONE HYDROCHLORIDE 5 MILLIGRAM(S): 30 TABLET ORAL at 12:58

## 2025-02-28 RX ADMIN — SODIUM CHLORIDE 50 MILLILITER(S): 9 INJECTION, SOLUTION INTRAVENOUS at 08:50

## 2025-02-28 NOTE — ASU PREOP CHECKLIST - 1.
Pt sustained abraded area with small nick at time preop Surgical clippers were used. Minimal bleeding. Pressure applied with DSD; bleeding resolved.

## 2025-02-28 NOTE — ASU PREOP CHECKLIST - SKIN PREP
RECORDS RECEIVED FROM:    REASON FOR VISIT: Memory Concerns    Date of Appt: 5/31/2023   NOTES (FOR ALL VISITS) STATUS DETAILS   OFFICE NOTE from referring provider Scanned to chart/Media Tab  Dr. Melgoza-1/18/2023   OFFICE NOTE from other specialist     DISCHARGE SUMMARY from hospital     DISCHARGE REPORT from the ER     OPERATIVE REPORT     [facility] ([facility] ONLY)     EMG     EEG     MEDICATION LIST     IMAGING  (FOR ALL VISITS)     LUMBAR PUNCTURE     SHAUNA SCAN     ULTRASOUND (CAROTID BILAT) *VASCULAR*     MRI (HEAD, NECK, SPINE)     CT (HEAD, NECK, SPINE)           Clorhexadine wipe in ASU by PCA/done

## 2025-02-28 NOTE — ASU DISCHARGE PLAN (ADULT/PEDIATRIC) - CARE PROVIDER_API CALL
Stacia Green  Surgery  [address], [address]  [place] [address]  Phone: [phone]  Fax: [phone]  Follow Up Time:

## 2025-02-28 NOTE — ASU DISCHARGE PLAN (ADULT/PEDIATRIC) - PATIENT EDUCATION MATERIALS PROVIED
tylenol, ibuprofen, oxycodone, cold therapy, narcan/Pre-printed instructions given for other (specify)

## 2025-02-28 NOTE — BRIEF OPERATIVE NOTE - NSICDXBRIEFPROCEDURE_GEN_ALL_CORE_FT
PROCEDURES:  Repair, hernia, inguinal, robot-assisted, using da Sepideh Cinthia 28-Feb-2025 12:26:33 with mesh, right Carol Buridck

## 2025-02-28 NOTE — ASU DISCHARGE PLAN (ADULT/PEDIATRIC) - ASU DC SPECIAL INSTRUCTIONSFT
May shower.  Allow soapy water run over groin and pat dry  Regular ambulation  Deep breathing./Incentive spirometer  Ice packs to abdomen as needed- 15 minutes on and 15 minutes off  Regular diet, advance as tolerated  Drink plenty of fluids (non caffeinated) while taking pain medications     No reaching, pulling, pushing, lifting >5lbs (~a gallon of milk)    Pain medication is given immediately following your surgery to help with post-operative pain. Upon  your discharge home, we suggest scheduled doses of Acetaminophen (tylenol) every 6 hours and  Ibuprofen (Motrin) every 6 hours, alternating between medications every 3 hours (i.e. Take tylenol  and wait 3 hours, then take Motrin and wait 3 hours, repeat) for the first 3-4 days after surgery. If  you are without significant pain, medications can be taken more infrequently. It is important to  follow dosing instructions on the medication bottle or prescription.    You are prescribed a narcotic pain medication for post-operative pain.  You are also taking xanax at home.  Taking the 2 together may increase your risk of respiratory depression.  If you develop any shallow breathing or difficulty taking a deep breath along with dizziness, lightheadedness, somnolence/lethargy/sleepiness - take the Narcan as prescribed and call 911.

## 2025-02-28 NOTE — ASU DISCHARGE PLAN (ADULT/PEDIATRIC) - NURSING INSTRUCTIONS
Drink plenty of fluids. Use ice packs. Alternate taking tylenol and ibuprofen for mild-moderate discomfort. Take prescribed oxycodone for severe pain. Please follow up with MD for post-op appointment. Drink plenty of fluids. Use ice packs. Alternate taking tylenol and ibuprofen for mild-moderate discomfort. Take prescribed oxycodone for severe pain. Ambulate often. Please follow up with MD for post-op appointment.

## 2025-02-28 NOTE — ASU DISCHARGE PLAN (ADULT/PEDIATRIC) - FINANCIAL ASSISTANCE
[facility] provides services at a reduced cost to those who are determined to be eligible through [facility]’s financial assistance program. Information regarding [facility]’s financial assistance program can be found by going to [email or web address] or by calling [phone].

## 2025-02-28 NOTE — ASU PATIENT PROFILE, ADULT - FALL HARM RISK - ATTEMPT OOB
[facility]) Physicians  Internal Medicine  Patient Encounter  Jc Harmon D.O., Kerry Angella         Chief Complaint   Patient presents with    Medication Check    Check-Up       HPI: 80 y.o. male with multiple complex medical problems seen today for a routine checkup regarding the status of his current chronic medical problems listed below along with the medication check. He has been seeing ortho at [place]. He had an MRI of the L-Spine. He has DDD. He was sent to Pain-- Dr. Marlena Viveros. Health maintenance items or overdue:  Flu vaccine  Pneumovax  Shingles vaccine    HTN-- at his last visit his home pressure readings were 653 to 313J systolic. He remains on carvedilol 12.5 mg twice a day, Avapro 300 mg daily, nifedipine extended release 30 mg daily. He has tolerated the medications well. Home readings since last visit-- 130's. His BP has been known to be accelerated in the office setting. Hyperlipidemia:    Lab Results   Component Value Date    LDLCALC 37 07/30/2018   Patient continues to tolerate high intensity statin therapy. He is on Lipitor 80 mg daily for cardiovascular risk reduction. He has had a stroke in the past as well as arterial disease peripherally. He does not follow a strict low-fat diet. He does not exercise. He denies any new myalgias. PAD/Carotid stenosis--   Previous history----> Patient has history of chronic mesenteric ischemia and underwent SMA bypass  11/29/2016. Patient's also had a left fem-pop bypass 9/14/2016. He also has EMELINA, carotid stenosis, abd and thoracic aortic ASVD. Last carotid doppler-- 16-49% BL, 7/2017  Patient is under the care of Dr. Luca Pandya. He denies claudication, unilateral weakness or paresthesias. He denies feet wounds. He denies postprandial intestinal angina. He remains on Plavix     CAD--Previous history---->coronary artery calcification seen on CT scan July 2014. Stress Myoview was neg 1/24/2015.     Interval history-----> since his already scheduled. He'll have carotid Doppler as well as lower extremity vascular studies were  - Lipid Panel; Future    4. Pulmonary emphysema, unspecified emphysema type ([address]  Condition is stable without signs of respiratory symptoms  Flu and pneumonia vaccines today    5. Coronary artery disease involving native coronary artery of native heart without angina pectoris  Condition is stable without signs of angina or anginal equivalents. No signs of cardiac decompensation  Continue cardiovascular risk factor management. He'll continue Plavix alone without aspirin due to GI bleeding. Continue to monitor for GI bleeding  - Lipid Panel; Future    6. History of GI bleed  As above  Check CBC  Follow-up with GI as scheduled  May need capsule endoscopy if anemia continues  - CBC Auto Differential; Future    7. Normocytic anemia    - CBC Auto Differential; Future  - Iron and TIBC; Future  - Ferritin; Future    8. Flu vaccine need    - INFLUENZA, HIGH DOSE, 65 YRS +, IM, PF, PREFILL SYR, 0.5ML (FLUZONE HD)    9. Need for pneumococcal vaccination    - PNEUMOVAX 23 subcutaneous/IM (Pneumococcal polysaccharide vaccine 23-valent >= 1yo)    10. Drug therapy    - Magnesium;  Future No

## 2025-02-28 NOTE — ASU DISCHARGE PLAN (ADULT/PEDIATRIC) - NS MD DC FALL RISK RISK
For information on Fall & Injury Prevention, visit: [email or web address] OR  [email or web address] OR  https://www.cdc.gov/steadi/patient.html

## 2025-02-28 NOTE — ASU PATIENT PROFILE, ADULT - FALL HARM RISK - UNIVERSAL INTERVENTIONS
Bed in lowest position, wheels locked, appropriate side rails in place/Call bell, personal items and telephone in reach/Instruct patient to call for assistance before getting out of bed or chair/Non-slip footwear when patient is out of bed/[place] to call system/Physically safe environment - no spills, clutter or unnecessary equipment/Purposeful Proactive Rounding/Room/bathroom lighting operational, light cord in reach

## 2025-03-02 PROBLEM — R11.2 POST-OPERATIVE NAUSEA AND VOMITING: Status: ACTIVE | Noted: 2025-03-02

## 2025-03-02 PROBLEM — R11.0 NAUSEA: Status: ACTIVE | Noted: 2025-03-02

## 2025-03-07 ENCOUNTER — APPOINTMENT (OUTPATIENT)
Dept: SURGERY | Facility: CLINIC | Age: 85
End: 2025-03-07
Payer: MEDICARE

## 2025-03-07 VITALS
OXYGEN SATURATION: 96 % | DIASTOLIC BLOOD PRESSURE: 56 MMHG | RESPIRATION RATE: 16 BRPM | SYSTOLIC BLOOD PRESSURE: 132 MMHG | HEIGHT: 65 IN | BODY MASS INDEX: 18.16 KG/M2 | TEMPERATURE: 97.4 F | WEIGHT: 109 LBS | HEART RATE: 67 BPM

## 2025-03-07 DIAGNOSIS — Z98.890 OTHER SPECIFIED POSTPROCEDURAL STATES: ICD-10-CM

## 2025-03-07 DIAGNOSIS — Z87.19 OTHER SPECIFIED POSTPROCEDURAL STATES: ICD-10-CM

## 2025-03-07 PROCEDURE — 99024 POSTOP FOLLOW-UP VISIT: CPT

## 2025-03-08 PROBLEM — Z98.890 S/P RIGHT INGUINAL HERNIA REPAIR: Status: ACTIVE | Noted: 2025-03-08

## 2025-03-12 ENCOUNTER — APPOINTMENT (OUTPATIENT)
Dept: UROGYNECOLOGY | Facility: CLINIC | Age: 85
End: 2025-03-12

## 2025-03-13 ENCOUNTER — NON-APPOINTMENT (OUTPATIENT)
Age: 85
End: 2025-03-13

## 2025-03-18 ENCOUNTER — APPOINTMENT (OUTPATIENT)
Dept: SURGERY | Facility: CLINIC | Age: 85
End: 2025-03-18

## 2025-03-21 ENCOUNTER — TRANSCRIPTION ENCOUNTER (OUTPATIENT)
Age: 85
End: 2025-03-21

## 2025-03-25 ENCOUNTER — TRANSCRIPTION ENCOUNTER (OUTPATIENT)
Age: 85
End: 2025-03-25

## 2025-03-25 ENCOUNTER — APPOINTMENT (OUTPATIENT)
Dept: UROLOGY | Facility: CLINIC | Age: 85
End: 2025-03-25
Payer: MEDICARE

## 2025-03-25 VITALS
OXYGEN SATURATION: 97 % | DIASTOLIC BLOOD PRESSURE: 70 MMHG | HEART RATE: 75 BPM | SYSTOLIC BLOOD PRESSURE: 122 MMHG | TEMPERATURE: 97.5 F

## 2025-03-25 DIAGNOSIS — L90.0 LICHEN SCLEROSUS ET ATROPHICUS: ICD-10-CM

## 2025-03-25 DIAGNOSIS — C51.9 MALIGNANT NEOPLASM OF VULVA, UNSPECIFIED: ICD-10-CM

## 2025-03-25 DIAGNOSIS — N39.0 URINARY TRACT INFECTION, SITE NOT SPECIFIED: ICD-10-CM

## 2025-03-25 DIAGNOSIS — N90.89 OTHER SPECIFIED NONINFLAMMATORY DISORDERS OF VULVA AND PERINEUM: ICD-10-CM

## 2025-03-25 DIAGNOSIS — N36.2 URETHRAL CARUNCLE: ICD-10-CM

## 2025-03-25 LAB
BILIRUB UR QL STRIP: NEGATIVE
CLARITY UR: CLEAR
COLLECTION METHOD: NORMAL
GLUCOSE UR-MCNC: NEGATIVE
HCG UR QL: 0.2 EU/DL
HGB UR QL STRIP.AUTO: ABNORMAL
KETONES UR-MCNC: NEGATIVE
LEUKOCYTE ESTERASE UR QL STRIP: NEGATIVE
NITRITE UR QL STRIP: NEGATIVE
PH UR STRIP: 7
PROT UR STRIP-MCNC: NEGATIVE
SP GR UR STRIP: 1.02

## 2025-03-25 PROCEDURE — 99213 OFFICE O/P EST LOW 20 MIN: CPT

## 2025-03-25 PROCEDURE — 99459 PELVIC EXAMINATION: CPT

## 2025-03-25 RX ORDER — METHENAMINE MANDELATE 1000 MG/1
1 TABLET, FILM COATED ORAL
Refills: 0 | Status: ACTIVE | COMMUNITY

## 2025-03-28 ENCOUNTER — APPOINTMENT (OUTPATIENT)
Dept: SURGERY | Facility: CLINIC | Age: 85
End: 2025-03-28
Payer: MEDICARE

## 2025-03-28 VITALS
HEIGHT: 65 IN | WEIGHT: 109 LBS | BODY MASS INDEX: 18.16 KG/M2 | SYSTOLIC BLOOD PRESSURE: 109 MMHG | OXYGEN SATURATION: 93 % | DIASTOLIC BLOOD PRESSURE: 60 MMHG | TEMPERATURE: 97.5 F | HEART RATE: 63 BPM | RESPIRATION RATE: 16 BRPM

## 2025-03-28 DIAGNOSIS — M62.08 SEPARATION OF MUSCLE (NONTRAUMATIC), OTHER SITE: ICD-10-CM

## 2025-03-28 PROCEDURE — 99215 OFFICE O/P EST HI 40 MIN: CPT | Mod: 24

## 2025-03-30 PROBLEM — M62.08 DIASTASIS RECTI: Status: ACTIVE | Noted: 2025-03-30

## 2025-04-01 ENCOUNTER — TRANSCRIPTION ENCOUNTER (OUTPATIENT)
Age: 85
End: 2025-04-01

## 2025-04-11 ENCOUNTER — NON-APPOINTMENT (OUTPATIENT)
Age: 85
End: 2025-04-11

## 2025-04-14 ENCOUNTER — TRANSCRIPTION ENCOUNTER (OUTPATIENT)
Age: 85
End: 2025-04-14

## 2025-04-15 ENCOUNTER — TRANSCRIPTION ENCOUNTER (OUTPATIENT)
Age: 85
End: 2025-04-15

## 2025-04-18 ENCOUNTER — APPOINTMENT (OUTPATIENT)
Dept: DERMATOLOGY | Facility: CLINIC | Age: 85
End: 2025-04-18

## 2025-04-25 ENCOUNTER — TRANSCRIPTION ENCOUNTER (OUTPATIENT)
Age: 85
End: 2025-04-25

## 2025-05-09 ENCOUNTER — APPOINTMENT (OUTPATIENT)
Dept: DERMATOLOGY | Facility: CLINIC | Age: 85
End: 2025-05-09
Payer: MEDICARE

## 2025-05-09 DIAGNOSIS — L30.9 DERMATITIS, UNSPECIFIED: ICD-10-CM

## 2025-05-09 DIAGNOSIS — L72.0 EPIDERMAL CYST: ICD-10-CM

## 2025-05-09 PROCEDURE — 99213 OFFICE O/P EST LOW 20 MIN: CPT

## 2025-05-09 RX ORDER — TRIAMCINOLONE ACETONIDE 1 MG/G
0.1 CREAM TOPICAL
Qty: 1 | Refills: 1 | Status: ACTIVE | COMMUNITY
Start: 2025-05-09 | End: 1900-01-01

## 2025-05-15 ENCOUNTER — TRANSCRIPTION ENCOUNTER (OUTPATIENT)
Age: 85
End: 2025-05-15

## 2025-05-16 ENCOUNTER — NON-APPOINTMENT (OUTPATIENT)
Age: 85
End: 2025-05-16

## 2025-05-20 ENCOUNTER — TRANSCRIPTION ENCOUNTER (OUTPATIENT)
Age: 85
End: 2025-05-20

## 2025-05-20 DIAGNOSIS — D64.9 ANEMIA, UNSPECIFIED: ICD-10-CM

## 2025-05-21 ENCOUNTER — TRANSCRIPTION ENCOUNTER (OUTPATIENT)
Age: 85
End: 2025-05-21

## 2025-05-21 ENCOUNTER — APPOINTMENT (OUTPATIENT)
Dept: FAMILY MEDICINE | Facility: CLINIC | Age: 85
End: 2025-05-21
Payer: MEDICARE

## 2025-05-21 VITALS
TEMPERATURE: 98.9 F | DIASTOLIC BLOOD PRESSURE: 57 MMHG | HEIGHT: 65 IN | BODY MASS INDEX: 17.83 KG/M2 | SYSTOLIC BLOOD PRESSURE: 128 MMHG | HEART RATE: 52 BPM | OXYGEN SATURATION: 95 % | WEIGHT: 107 LBS | RESPIRATION RATE: 16 BRPM

## 2025-05-21 DIAGNOSIS — K58.9 IRRITABLE BOWEL SYNDROME, UNSPECIFIED: ICD-10-CM

## 2025-05-21 DIAGNOSIS — M25.549 PAIN IN JOINTS OF UNSPECIFIED HAND: ICD-10-CM

## 2025-05-21 DIAGNOSIS — G89.28 OTHER CHRONIC POSTPROCEDURAL PAIN: ICD-10-CM

## 2025-05-21 PROCEDURE — 99214 OFFICE O/P EST MOD 30 MIN: CPT

## 2025-05-23 ENCOUNTER — APPOINTMENT (OUTPATIENT)
Dept: CT IMAGING | Facility: HOSPITAL | Age: 85
End: 2025-05-23
Payer: MEDICARE

## 2025-05-23 ENCOUNTER — OUTPATIENT (OUTPATIENT)
Dept: OUTPATIENT SERVICES | Facility: HOSPITAL | Age: 85
LOS: 1 days | End: 2025-05-23
Payer: MEDICARE

## 2025-05-23 DIAGNOSIS — Z98.890 OTHER SPECIFIED POSTPROCEDURAL STATES: Chronic | ICD-10-CM

## 2025-05-23 DIAGNOSIS — Z98.41 CATARACT EXTRACTION STATUS, RIGHT EYE: Chronic | ICD-10-CM

## 2025-05-23 DIAGNOSIS — Z98.891 HISTORY OF UTERINE SCAR FROM PREVIOUS SURGERY: Chronic | ICD-10-CM

## 2025-05-23 DIAGNOSIS — G89.28 OTHER CHRONIC POSTPROCEDURAL PAIN: ICD-10-CM

## 2025-05-23 LAB
ALBUMIN SERPL ELPH-MCNC: 4.8 G/DL
ALP BLD-CCNC: 37 U/L
ALT SERPL-CCNC: 21 U/L
ANION GAP SERPL CALC-SCNC: 13 MMOL/L
AST SERPL-CCNC: 14 U/L
BILIRUB SERPL-MCNC: 0.4 MG/DL
BUN SERPL-MCNC: 26 MG/DL
CALCIUM SERPL-MCNC: 10.4 MG/DL
CHLORIDE SERPL-SCNC: 103 MMOL/L
CO2 SERPL-SCNC: 26 MMOL/L
CREAT SERPL-MCNC: 0.55 MG/DL
EGFRCR SERPLBLD CKD-EPI 2021: 90 ML/MIN/1.73M2
GLUCOSE SERPL-MCNC: 95 MG/DL
POTASSIUM SERPL-SCNC: 4.4 MMOL/L
PROT SERPL-MCNC: 6.8 G/DL
SODIUM SERPL-SCNC: 142 MMOL/L

## 2025-05-23 PROCEDURE — 74176 CT ABD & PELVIS W/O CONTRAST: CPT | Mod: 26

## 2025-05-23 PROCEDURE — 74176 CT ABD & PELVIS W/O CONTRAST: CPT

## 2025-05-29 ENCOUNTER — TRANSCRIPTION ENCOUNTER (OUTPATIENT)
Age: 85
End: 2025-05-29

## 2025-06-02 ENCOUNTER — APPOINTMENT (OUTPATIENT)
Dept: ORTHOPEDIC SURGERY | Facility: CLINIC | Age: 85
End: 2025-06-02

## 2025-06-04 ENCOUNTER — APPOINTMENT (OUTPATIENT)
Dept: FAMILY MEDICINE | Facility: CLINIC | Age: 85
End: 2025-06-04
Payer: MEDICARE

## 2025-06-04 VITALS
DIASTOLIC BLOOD PRESSURE: 68 MMHG | SYSTOLIC BLOOD PRESSURE: 115 MMHG | HEIGHT: 65 IN | TEMPERATURE: 97.4 F | OXYGEN SATURATION: 97 % | RESPIRATION RATE: 16 BRPM | HEART RATE: 68 BPM | WEIGHT: 107 LBS | BODY MASS INDEX: 17.83 KG/M2

## 2025-06-04 VITALS — HEIGHT: 64 IN | BODY MASS INDEX: 18.37 KG/M2

## 2025-06-04 DIAGNOSIS — R10.9 UNSPECIFIED ABDOMINAL PAIN: ICD-10-CM

## 2025-06-04 DIAGNOSIS — K59.00 CONSTIPATION, UNSPECIFIED: ICD-10-CM

## 2025-06-04 PROCEDURE — 99213 OFFICE O/P EST LOW 20 MIN: CPT

## 2025-06-10 ENCOUNTER — TRANSCRIPTION ENCOUNTER (OUTPATIENT)
Age: 85
End: 2025-06-10

## 2025-06-12 ENCOUNTER — TRANSCRIPTION ENCOUNTER (OUTPATIENT)
Age: 85
End: 2025-06-12

## 2025-06-16 ENCOUNTER — OUTPATIENT (OUTPATIENT)
Dept: OUTPATIENT SERVICES | Facility: HOSPITAL | Age: 85
LOS: 1 days | End: 2025-06-16
Payer: MEDICARE

## 2025-06-16 ENCOUNTER — APPOINTMENT (OUTPATIENT)
Dept: ULTRASOUND IMAGING | Facility: CLINIC | Age: 85
End: 2025-06-16
Payer: MEDICARE

## 2025-06-16 DIAGNOSIS — Z98.890 OTHER SPECIFIED POSTPROCEDURAL STATES: Chronic | ICD-10-CM

## 2025-06-16 DIAGNOSIS — Z98.41 CATARACT EXTRACTION STATUS, RIGHT EYE: Chronic | ICD-10-CM

## 2025-06-16 DIAGNOSIS — Z98.891 HISTORY OF UTERINE SCAR FROM PREVIOUS SURGERY: Chronic | ICD-10-CM

## 2025-06-16 PROCEDURE — 76705 ECHO EXAM OF ABDOMEN: CPT | Mod: 26

## 2025-06-16 PROCEDURE — 76705 ECHO EXAM OF ABDOMEN: CPT

## 2025-06-20 ENCOUNTER — APPOINTMENT (OUTPATIENT)
Dept: FAMILY MEDICINE | Facility: CLINIC | Age: 85
End: 2025-06-20
Payer: MEDICARE

## 2025-06-20 VITALS
HEIGHT: 64 IN | DIASTOLIC BLOOD PRESSURE: 65 MMHG | HEART RATE: 61 BPM | RESPIRATION RATE: 14 BRPM | WEIGHT: 109 LBS | BODY MASS INDEX: 18.61 KG/M2 | TEMPERATURE: 98.3 F | OXYGEN SATURATION: 100 % | SYSTOLIC BLOOD PRESSURE: 127 MMHG

## 2025-06-20 PROBLEM — Z23 ENCOUNTER FOR IMMUNIZATION: Status: ACTIVE | Noted: 2025-06-20

## 2025-06-20 PROCEDURE — G0439: CPT

## 2025-06-24 ENCOUNTER — APPOINTMENT (OUTPATIENT)
Dept: UROLOGY | Facility: CLINIC | Age: 85
End: 2025-06-24
Payer: MEDICARE

## 2025-06-24 ENCOUNTER — APPOINTMENT (OUTPATIENT)
Dept: SURGERY | Facility: CLINIC | Age: 85
End: 2025-06-24
Payer: MEDICARE

## 2025-06-24 VITALS
BODY MASS INDEX: 18.33 KG/M2 | WEIGHT: 110 LBS | DIASTOLIC BLOOD PRESSURE: 74 MMHG | SYSTOLIC BLOOD PRESSURE: 130 MMHG | TEMPERATURE: 96.7 F | OXYGEN SATURATION: 100 % | HEART RATE: 58 BPM | HEIGHT: 65 IN

## 2025-06-24 VITALS
WEIGHT: 109 LBS | HEIGHT: 64 IN | DIASTOLIC BLOOD PRESSURE: 70 MMHG | BODY MASS INDEX: 18.61 KG/M2 | OXYGEN SATURATION: 98 % | SYSTOLIC BLOOD PRESSURE: 100 MMHG | HEART RATE: 57 BPM

## 2025-06-24 PROBLEM — R10.31 CHRONIC PAIN OF RIGHT INGUINAL REGION: Status: ACTIVE | Noted: 2025-06-24

## 2025-06-24 PROBLEM — K42.9 UMBILICAL HERNIA WITHOUT OBSTRUCTION AND WITHOUT GANGRENE: Status: ACTIVE | Noted: 2025-06-24

## 2025-06-24 PROBLEM — Z87.19 HISTORY OF RIGHT INGUINAL HERNIA: Status: RESOLVED | Noted: 2025-02-19 | Resolved: 2025-06-24

## 2025-06-24 LAB
BILIRUB UR QL STRIP: NEGATIVE
CLARITY UR: CLEAR
COLLECTION METHOD: NORMAL
GLUCOSE UR-MCNC: NEGATIVE
HCG UR QL: 0.2 EU/DL
HGB UR QL STRIP.AUTO: NEGATIVE
KETONES UR-MCNC: NEGATIVE
LEUKOCYTE ESTERASE UR QL STRIP: NEGATIVE
NITRITE UR QL STRIP: NEGATIVE
PH UR STRIP: 5.5
PROT UR STRIP-MCNC: NEGATIVE
SP GR UR STRIP: 1.01

## 2025-06-24 PROCEDURE — 99213 OFFICE O/P EST LOW 20 MIN: CPT

## 2025-06-24 PROCEDURE — 99459 PELVIC EXAMINATION: CPT

## 2025-06-24 PROCEDURE — G2211 COMPLEX E/M VISIT ADD ON: CPT

## 2025-06-24 PROCEDURE — 99215 OFFICE O/P EST HI 40 MIN: CPT

## 2025-06-25 ENCOUNTER — TRANSCRIPTION ENCOUNTER (OUTPATIENT)
Age: 85
End: 2025-06-25

## 2025-06-27 ENCOUNTER — TRANSCRIPTION ENCOUNTER (OUTPATIENT)
Age: 85
End: 2025-06-27

## 2025-06-27 LAB
ALBUMIN SERPL ELPH-MCNC: 4.4 G/DL
ALP BLD-CCNC: 32 U/L
ALT SERPL-CCNC: 21 U/L
ANION GAP SERPL CALC-SCNC: 12 MMOL/L
AST SERPL-CCNC: 21 U/L
BASOPHILS # BLD AUTO: 0.07 K/UL
BASOPHILS NFR BLD AUTO: 2 %
BILIRUB SERPL-MCNC: 0.8 MG/DL
BUN SERPL-MCNC: 16 MG/DL
CALCIUM SERPL-MCNC: 9.2 MG/DL
CHLORIDE SERPL-SCNC: 105 MMOL/L
CHOLEST SERPL-MCNC: 192 MG/DL
CO2 SERPL-SCNC: 25 MMOL/L
CREAT SERPL-MCNC: 0.63 MG/DL
EGFRCR SERPLBLD CKD-EPI 2021: 87 ML/MIN/1.73M2
EOSINOPHIL # BLD AUTO: 0.14 K/UL
EOSINOPHIL NFR BLD AUTO: 4.1 %
ESTIMATED AVERAGE GLUCOSE: 117 MG/DL
GLUCOSE SERPL-MCNC: 91 MG/DL
HBA1C MFR BLD HPLC: 5.7 %
HCT VFR BLD CALC: 32.2 %
HDLC SERPL-MCNC: 111 MG/DL
HGB BLD-MCNC: 10.5 G/DL
IMM GRANULOCYTES NFR BLD AUTO: 0.3 %
LDLC SERPL-MCNC: 70 MG/DL
LYMPHOCYTES # BLD AUTO: 1.03 K/UL
LYMPHOCYTES NFR BLD AUTO: 29.9 %
MAN DIFF?: NORMAL
MCHC RBC-ENTMCNC: 32.6 G/DL
MCHC RBC-ENTMCNC: 34.1 PG
MCV RBC AUTO: 104.5 FL
MONOCYTES # BLD AUTO: 0.46 K/UL
MONOCYTES NFR BLD AUTO: 13.3 %
NEUTROPHILS # BLD AUTO: 1.74 K/UL
NEUTROPHILS NFR BLD AUTO: 50.4 %
NONHDLC SERPL-MCNC: 81 MG/DL
PLATELET # BLD AUTO: 182 K/UL
POTASSIUM SERPL-SCNC: 4.1 MMOL/L
PROT SERPL-MCNC: 6.6 G/DL
RBC # BLD: 3.08 M/UL
RBC # FLD: 16.7 %
SODIUM SERPL-SCNC: 142 MMOL/L
TRIGL SERPL-MCNC: 59 MG/DL
WBC # FLD AUTO: 3.45 K/UL

## 2025-06-30 ENCOUNTER — NON-APPOINTMENT (OUTPATIENT)
Age: 85
End: 2025-06-30

## 2025-07-01 ENCOUNTER — TRANSCRIPTION ENCOUNTER (OUTPATIENT)
Age: 85
End: 2025-07-01

## 2025-07-07 ENCOUNTER — APPOINTMENT (OUTPATIENT)
Dept: DERMATOLOGY | Facility: CLINIC | Age: 85
End: 2025-07-07
Payer: MEDICARE

## 2025-07-07 PROBLEM — D22.9 MULTIPLE BENIGN NEVI: Status: ACTIVE | Noted: 2025-07-07

## 2025-07-07 PROBLEM — Z13.89 SCREENING FOR SKIN CONDITION: Status: ACTIVE | Noted: 2025-07-07

## 2025-07-07 PROBLEM — L81.4 LENTIGINES: Status: ACTIVE | Noted: 2025-07-07

## 2025-07-07 PROCEDURE — 99213 OFFICE O/P EST LOW 20 MIN: CPT | Mod: 25

## 2025-07-07 PROCEDURE — 17004 DESTROY PREMAL LESIONS 15/>: CPT

## 2025-07-19 ENCOUNTER — NON-APPOINTMENT (OUTPATIENT)
Age: 85
End: 2025-07-19

## 2025-08-04 ENCOUNTER — TRANSCRIPTION ENCOUNTER (OUTPATIENT)
Age: 85
End: 2025-08-04

## 2025-08-19 ENCOUNTER — TRANSCRIPTION ENCOUNTER (OUTPATIENT)
Age: 85
End: 2025-08-19

## 2025-08-22 ENCOUNTER — NON-APPOINTMENT (OUTPATIENT)
Age: 85
End: 2025-08-22

## 2025-08-30 DIAGNOSIS — B37.0 CANDIDAL STOMATITIS: ICD-10-CM

## 2025-08-30 RX ORDER — NYSTATIN 100000 [USP'U]/ML
100000 SUSPENSION ORAL 4 TIMES DAILY
Qty: 140 | Refills: 0 | Status: ACTIVE | COMMUNITY
Start: 2025-08-30 | End: 1900-01-01

## 2025-09-02 ENCOUNTER — APPOINTMENT (OUTPATIENT)
Dept: GYNECOLOGIC ONCOLOGY | Facility: CLINIC | Age: 85
End: 2025-09-02
Payer: MEDICARE

## 2025-09-02 VITALS
TEMPERATURE: 97.3 F | HEIGHT: 65 IN | DIASTOLIC BLOOD PRESSURE: 77 MMHG | WEIGHT: 107 LBS | SYSTOLIC BLOOD PRESSURE: 137 MMHG | HEART RATE: 50 BPM | OXYGEN SATURATION: 97 % | BODY MASS INDEX: 17.83 KG/M2

## 2025-09-02 DIAGNOSIS — C51.9 MALIGNANT NEOPLASM OF VULVA, UNSPECIFIED: ICD-10-CM

## 2025-09-02 PROCEDURE — 99459 PELVIC EXAMINATION: CPT

## 2025-09-02 PROCEDURE — 99213 OFFICE O/P EST LOW 20 MIN: CPT

## 2025-09-02 PROCEDURE — G2211 COMPLEX E/M VISIT ADD ON: CPT

## 2025-09-03 ENCOUNTER — TRANSCRIPTION ENCOUNTER (OUTPATIENT)
Age: 85
End: 2025-09-03

## 2025-09-04 ENCOUNTER — APPOINTMENT (OUTPATIENT)
Dept: OPHTHALMOLOGY | Facility: CLINIC | Age: 85
End: 2025-09-04

## 2025-09-09 ENCOUNTER — APPOINTMENT (OUTPATIENT)
Dept: NEUROLOGY | Facility: CLINIC | Age: 85
End: 2025-09-09
Payer: MEDICARE

## 2025-09-09 VITALS
BODY MASS INDEX: 17.83 KG/M2 | WEIGHT: 107 LBS | SYSTOLIC BLOOD PRESSURE: 119 MMHG | DIASTOLIC BLOOD PRESSURE: 69 MMHG | TEMPERATURE: 97 F | OXYGEN SATURATION: 98 % | HEIGHT: 65 IN | HEART RATE: 62 BPM

## 2025-09-09 DIAGNOSIS — M47.816 SPONDYLOSIS W/OUT MYELOPATHY OR RADICULOPATHY, LUMBAR REGION: ICD-10-CM

## 2025-09-09 DIAGNOSIS — M47.812 SPONDYLOSIS W/OUT MYELOPATHY OR RADICULOPATHY, CERVICAL REGION: ICD-10-CM

## 2025-09-09 PROCEDURE — 99214 OFFICE O/P EST MOD 30 MIN: CPT

## 2025-09-09 PROCEDURE — G2211 COMPLEX E/M VISIT ADD ON: CPT

## (undated) DEVICE — SUT SURGIPRO 2-0 30" V-20

## (undated) DEVICE — PACK PERI GYN

## (undated) DEVICE — WARMING BLANKET UPPER ADULT

## (undated) DEVICE — PREP BETADINE KIT

## (undated) DEVICE — POSITIONER FOAM HEAD CRADLE (PINK)

## (undated) DEVICE — FOLEY TRAY 16FR 5CC LF UMETER CLOSED

## (undated) DEVICE — MEDICINE CUP WITH LID 60ML

## (undated) DEVICE — SUT SOFSILK 2-0 18" C-15

## (undated) DEVICE — STAPLER SKIN VISI-STAT 35 WIDE

## (undated) DEVICE — DRSG DERMABOND 0.7ML

## (undated) DEVICE — SUT POLYSORB 4-0 30" C-13 UNDYED

## (undated) DEVICE — PACK MINOR WITH LAP

## (undated) DEVICE — ELCTR BOVIE PENCIL SMOKE EVACUATION

## (undated) DEVICE — SUT SURGIPRO 0 30" GS-22

## (undated) DEVICE — DRAPE 3/4 SHEET 52X76"

## (undated) DEVICE — NDL COUNTER FOAM AND ADHESIVE 40-70

## (undated) DEVICE — DRAPE INSTRUMENT POUCH 6.75" X 11"

## (undated) DEVICE — VENODYNE/SCD SLEEVE CALF MEDIUM

## (undated) DEVICE — POSITIONER PINK PAD PIGAZZI SYSTEM

## (undated) DEVICE — ELCTR STRYKER NEPTUNE SMOKE EVACUATION PENCIL (GREEN)

## (undated) DEVICE — GOWN TRIMAX LG

## (undated) DEVICE — DRSG STERISTRIPS 0.5 X 4"

## (undated) DEVICE — GLV 6.5 PROTEXIS (BLUE)

## (undated) DEVICE — SOL IRR POUR NS 0.9% 500ML

## (undated) DEVICE — SUT SILK 2-0 18" FS

## (undated) DEVICE — DRAPE 1/2 SHEET 40X57"

## (undated) DEVICE — GLV 8.5 PROTEXIS (WHITE)

## (undated) DEVICE — SUT VICRYL PLUS 4-0 27" SH

## (undated) DEVICE — SUT VICRYL 2-0 27" SH UNDYED

## (undated) DEVICE — PREP CHLORAPREP HI-LITE ORANGE 26ML

## (undated) DEVICE — GLV COTTON DEROYAL XL

## (undated) DEVICE — DRAIN PENROSE 1" X 18" LATEX

## (undated) DEVICE — DRSG CURITY GAUZE SPONGE 4 X 4" 12-PLY

## (undated) DEVICE — DRAPE WARMING SOLUTION 44 X 44"

## (undated) DEVICE — SOL IRR POUR H2O 1500ML

## (undated) DEVICE — GOWN XL

## (undated) DEVICE — SPONGE PEANUT AUTO COUNT

## (undated) DEVICE — SUT POLYSORB 3-0 30" V-20 UNDYED

## (undated) DEVICE — ELCTR REM POLYHESIVE ADULT PT RETURN 15FT

## (undated) DEVICE — CANISTER SUCTION LID GUARD 3000CC

## (undated) DEVICE — LAP PAD 18 X 18"

## (undated) DEVICE — SUT POLYSORB 3-0 18" V-20 UNDYED

## (undated) DEVICE — SPECIMEN CONTAINER 100ML

## (undated) DEVICE — DRAPE LIGHT HANDLE COVER (GREEN)

## (undated) DEVICE — SOLIDIFIER CANN EXPRESS 3K

## (undated) DEVICE — PACK MINOR

## (undated) DEVICE — DRSG TELFA 3 X 8